# Patient Record
Sex: FEMALE | Race: BLACK OR AFRICAN AMERICAN | NOT HISPANIC OR LATINO
[De-identification: names, ages, dates, MRNs, and addresses within clinical notes are randomized per-mention and may not be internally consistent; named-entity substitution may affect disease eponyms.]

---

## 2018-03-13 ENCOUNTER — APPOINTMENT (OUTPATIENT)
Dept: RHEUMATOLOGY | Facility: CLINIC | Age: 38
End: 2018-03-13

## 2018-03-20 ENCOUNTER — APPOINTMENT (OUTPATIENT)
Dept: RHEUMATOLOGY | Facility: CLINIC | Age: 38
End: 2018-03-20
Payer: MEDICAID

## 2018-03-20 VITALS
OXYGEN SATURATION: 96 % | WEIGHT: 105 LBS | HEART RATE: 72 BPM | DIASTOLIC BLOOD PRESSURE: 73 MMHG | TEMPERATURE: 97.5 F | RESPIRATION RATE: 16 BRPM | HEIGHT: 64 IN | SYSTOLIC BLOOD PRESSURE: 122 MMHG | BODY MASS INDEX: 17.93 KG/M2

## 2018-03-20 DIAGNOSIS — Z82.69 FAMILY HISTORY OF OTHER DISEASES OF THE MUSCULOSKELETAL SYSTEM AND CONNECTIVE TISSUE: ICD-10-CM

## 2018-03-20 DIAGNOSIS — F17.200 NICOTINE DEPENDENCE, UNSPECIFIED, UNCOMPLICATED: ICD-10-CM

## 2018-03-20 DIAGNOSIS — Z78.9 OTHER SPECIFIED HEALTH STATUS: ICD-10-CM

## 2018-03-20 DIAGNOSIS — I10 ESSENTIAL (PRIMARY) HYPERTENSION: ICD-10-CM

## 2018-03-20 DIAGNOSIS — Z86.79 PERSONAL HISTORY OF OTHER DISEASES OF THE CIRCULATORY SYSTEM: ICD-10-CM

## 2018-03-20 PROCEDURE — 99205 OFFICE O/P NEW HI 60 MIN: CPT

## 2018-03-20 RX ORDER — TRIAMCINOLONE ACETONIDE 1 MG/G
0.1 OINTMENT TOPICAL TWICE DAILY
Qty: 80 | Refills: 0 | Status: ACTIVE | COMMUNITY
Start: 2018-03-20 | End: 1900-01-01

## 2018-03-22 LAB
APPEARANCE: CLEAR
BACTERIA: NEGATIVE
BILIRUBIN URINE: NEGATIVE
BLOOD URINE: NEGATIVE
C3 SERPL-MCNC: 101 MG/DL
C4 SERPL-MCNC: 30 MG/DL
COLOR: YELLOW
CREAT SPEC-SCNC: 137 MG/DL
CREAT/PROT UR: 0.1 RATIO
CRP SERPL-MCNC: <0.2 MG/DL
DSDNA AB SER-ACNC: <12 IU/ML
ERYTHROCYTE [SEDIMENTATION RATE] IN BLOOD BY WESTERGREN METHOD: 10 MM/HR
GLUCOSE QUALITATIVE U: NEGATIVE MG/DL
HYALINE CASTS: 3 /LPF
KETONES URINE: NEGATIVE
LEUKOCYTE ESTERASE URINE: NEGATIVE
MICROSCOPIC-UA: NORMAL
NITRITE URINE: NEGATIVE
PH URINE: 5.5
PROT UR-MCNC: 12 MG/DL
PROTEIN URINE: NEGATIVE MG/DL
RED BLOOD CELLS URINE: 7 /HPF
SPECIFIC GRAVITY URINE: 1.03
SQUAMOUS EPITHELIAL CELLS: 2 /HPF
THYROGLOB AB SERPL-ACNC: <20 IU/ML
THYROPEROXIDASE AB SERPL IA-ACNC: <10 IU/ML
UROBILINOGEN URINE: NEGATIVE MG/DL
WHITE BLOOD CELLS URINE: 1 /HPF

## 2018-03-23 LAB
ENA RNP AB SER IA-ACNC: 2.9 AL
ENA SM AB SER IA-ACNC: 0.2 AL
ENA SS-A AB SER IA-ACNC: <0.2 AL
ENA SS-B AB SER IA-ACNC: <0.2 AL

## 2018-03-26 LAB
ANA PAT FLD IF-IMP: ABNORMAL
ANA SER IF-ACNC: ABNORMAL

## 2018-04-26 ENCOUNTER — APPOINTMENT (OUTPATIENT)
Dept: RHEUMATOLOGY | Facility: CLINIC | Age: 38
End: 2018-04-26

## 2018-05-02 ENCOUNTER — APPOINTMENT (OUTPATIENT)
Dept: RHEUMATOLOGY | Facility: CLINIC | Age: 38
End: 2018-05-02

## 2018-07-05 ENCOUNTER — APPOINTMENT (OUTPATIENT)
Dept: RHEUMATOLOGY | Facility: CLINIC | Age: 38
End: 2018-07-05

## 2018-08-23 ENCOUNTER — APPOINTMENT (OUTPATIENT)
Dept: RHEUMATOLOGY | Facility: CLINIC | Age: 38
End: 2018-08-23

## 2018-10-29 ENCOUNTER — APPOINTMENT (OUTPATIENT)
Dept: RHEUMATOLOGY | Facility: CLINIC | Age: 38
End: 2018-10-29

## 2019-01-03 ENCOUNTER — APPOINTMENT (OUTPATIENT)
Dept: ANTEPARTUM | Facility: CLINIC | Age: 39
End: 2019-01-03
Payer: MEDICAID

## 2019-01-03 ENCOUNTER — ASOB RESULT (OUTPATIENT)
Age: 39
End: 2019-01-03

## 2019-01-03 DIAGNOSIS — O28.3 ABNORMAL ULTRASONIC FINDING ON ANTENATAL SCREENING OF MOTHER: ICD-10-CM

## 2019-01-03 PROCEDURE — 59020 FETAL CONTRACT STRESS TEST: CPT

## 2019-01-03 PROCEDURE — 76811 OB US DETAILED SNGL FETUS: CPT

## 2019-01-03 PROCEDURE — 76817 TRANSVAGINAL US OBSTETRIC: CPT

## 2019-01-03 PROCEDURE — 99201 OFFICE OUTPATIENT NEW 10 MINUTES: CPT | Mod: 25,TH

## 2019-01-04 ENCOUNTER — OUTPATIENT (OUTPATIENT)
Dept: OUTPATIENT SERVICES | Age: 39
LOS: 1 days | Discharge: HOME | End: 2019-01-04

## 2019-01-09 ENCOUNTER — APPOINTMENT (OUTPATIENT)
Dept: PEDIATRIC CARDIOLOGY | Facility: CLINIC | Age: 39
End: 2019-01-09
Payer: MEDICAID

## 2019-01-09 PROCEDURE — 76827 ECHO EXAM OF FETAL HEART: CPT

## 2019-01-09 PROCEDURE — 76820 UMBILICAL ARTERY ECHO: CPT

## 2019-01-09 PROCEDURE — 99203 OFFICE O/P NEW LOW 30 MIN: CPT | Mod: 25

## 2019-01-09 PROCEDURE — 76825 ECHO EXAM OF FETAL HEART: CPT

## 2019-01-09 PROCEDURE — 93325 DOPPLER ECHO COLOR FLOW MAPG: CPT | Mod: 59

## 2019-02-11 ENCOUNTER — ASOB RESULT (OUTPATIENT)
Age: 39
End: 2019-02-11

## 2019-02-11 ENCOUNTER — OUTPATIENT (OUTPATIENT)
Dept: OUTPATIENT SERVICES | Facility: HOSPITAL | Age: 39
LOS: 1 days | End: 2019-02-11

## 2019-02-11 ENCOUNTER — APPOINTMENT (OUTPATIENT)
Dept: ANTEPARTUM | Facility: CLINIC | Age: 39
End: 2019-02-11
Payer: MEDICAID

## 2019-02-11 PROCEDURE — 76816 OB US FOLLOW-UP PER FETUS: CPT

## 2019-02-11 PROCEDURE — 76818 FETAL BIOPHYS PROFILE W/NST: CPT | Mod: 26

## 2019-02-20 ENCOUNTER — APPOINTMENT (OUTPATIENT)
Dept: PEDIATRIC CARDIOLOGY | Facility: CLINIC | Age: 39
End: 2019-02-20

## 2019-02-22 DIAGNOSIS — O28.3 ABNORMAL ULTRASONIC FINDING ON ANTENATAL SCREENING OF MOTHER: ICD-10-CM

## 2019-02-22 DIAGNOSIS — O99.333 SMOKING (TOBACCO) COMPLICATING PREGNANCY, THIRD TRIMESTER: ICD-10-CM

## 2019-02-22 DIAGNOSIS — O99.89 OTHER SPECIFIED DISEASES AND CONDITIONS COMPLICATING PREGNANCY, CHILDBIRTH AND THE PUERPERIUM: ICD-10-CM

## 2019-02-22 DIAGNOSIS — O09.523 SUPERVISION OF ELDERLY MULTIGRAVIDA, THIRD TRIMESTER: ICD-10-CM

## 2019-03-13 ENCOUNTER — APPOINTMENT (OUTPATIENT)
Dept: ANTEPARTUM | Facility: CLINIC | Age: 39
End: 2019-03-13
Payer: MEDICAID

## 2019-03-13 ENCOUNTER — ASOB RESULT (OUTPATIENT)
Age: 39
End: 2019-03-13

## 2019-03-13 PROCEDURE — 76816 OB US FOLLOW-UP PER FETUS: CPT

## 2019-03-13 PROCEDURE — 76819 FETAL BIOPHYS PROFIL W/O NST: CPT

## 2019-03-26 ENCOUNTER — RX RENEWAL (OUTPATIENT)
Age: 39
End: 2019-03-26

## 2019-04-09 ENCOUNTER — ASOB RESULT (OUTPATIENT)
Age: 39
End: 2019-04-09

## 2019-04-09 ENCOUNTER — APPOINTMENT (OUTPATIENT)
Dept: ANTEPARTUM | Facility: HOSPITAL | Age: 39
End: 2019-04-09

## 2019-04-09 ENCOUNTER — INPATIENT (INPATIENT)
Facility: HOSPITAL | Age: 39
LOS: 2 days | Discharge: HOME | End: 2019-04-12
Attending: OBSTETRICS & GYNECOLOGY | Admitting: OBSTETRICS & GYNECOLOGY

## 2019-04-09 VITALS
SYSTOLIC BLOOD PRESSURE: 151 MMHG | TEMPERATURE: 98 F | DIASTOLIC BLOOD PRESSURE: 83 MMHG | RESPIRATION RATE: 18 BRPM | HEART RATE: 94 BPM

## 2019-04-09 DIAGNOSIS — Z3A.00 WEEKS OF GESTATION OF PREGNANCY NOT SPECIFIED: ICD-10-CM

## 2019-04-09 DIAGNOSIS — Z98.890 OTHER SPECIFIED POSTPROCEDURAL STATES: Chronic | ICD-10-CM

## 2019-04-09 DIAGNOSIS — O26.899 OTHER SPECIFIED PREGNANCY RELATED CONDITIONS, UNSPECIFIED TRIMESTER: ICD-10-CM

## 2019-04-09 LAB
ALBUMIN SERPL ELPH-MCNC: 3.6 G/DL — SIGNIFICANT CHANGE UP (ref 3.3–5)
ALP SERPL-CCNC: 152 U/L — HIGH (ref 40–120)
ALT FLD-CCNC: 14 U/L — SIGNIFICANT CHANGE UP (ref 4–33)
ANION GAP SERPL CALC-SCNC: 11 MMO/L — SIGNIFICANT CHANGE UP (ref 7–14)
APPEARANCE UR: CLEAR — SIGNIFICANT CHANGE UP
APTT BLD: 39 SEC — HIGH (ref 27.5–36.3)
AST SERPL-CCNC: 20 U/L — SIGNIFICANT CHANGE UP (ref 4–32)
BASOPHILS # BLD AUTO: 0.03 K/UL — SIGNIFICANT CHANGE UP (ref 0–0.2)
BASOPHILS NFR BLD AUTO: 0.3 % — SIGNIFICANT CHANGE UP (ref 0–2)
BILIRUB SERPL-MCNC: 0.2 MG/DL — SIGNIFICANT CHANGE UP (ref 0.2–1.2)
BILIRUB UR-MCNC: NEGATIVE — SIGNIFICANT CHANGE UP
BLD GP AB SCN SERPL QL: NEGATIVE — SIGNIFICANT CHANGE UP
BLOOD UR QL VISUAL: NEGATIVE — SIGNIFICANT CHANGE UP
BUN SERPL-MCNC: 10 MG/DL — SIGNIFICANT CHANGE UP (ref 7–23)
CALCIUM SERPL-MCNC: 9 MG/DL — SIGNIFICANT CHANGE UP (ref 8.4–10.5)
CHLORIDE SERPL-SCNC: 104 MMOL/L — SIGNIFICANT CHANGE UP (ref 98–107)
CO2 SERPL-SCNC: 21 MMOL/L — LOW (ref 22–31)
COLOR SPEC: YELLOW — SIGNIFICANT CHANGE UP
CREAT ?TM UR-MCNC: 86.1 MG/DL — SIGNIFICANT CHANGE UP
CREAT SERPL-MCNC: 0.68 MG/DL — SIGNIFICANT CHANGE UP (ref 0.5–1.3)
EOSINOPHIL # BLD AUTO: 0.13 K/UL — SIGNIFICANT CHANGE UP (ref 0–0.5)
EOSINOPHIL NFR BLD AUTO: 1.3 % — SIGNIFICANT CHANGE UP (ref 0–6)
FIBRINOGEN PPP-MCNC: 493 MG/DL — SIGNIFICANT CHANGE UP (ref 350–510)
GLUCOSE SERPL-MCNC: 69 MG/DL — LOW (ref 70–99)
GLUCOSE UR-MCNC: NEGATIVE — SIGNIFICANT CHANGE UP
HCT VFR BLD CALC: 31.7 % — LOW (ref 34.5–45)
HGB BLD-MCNC: 10.1 G/DL — LOW (ref 11.5–15.5)
IMM GRANULOCYTES NFR BLD AUTO: 0.6 % — SIGNIFICANT CHANGE UP (ref 0–1.5)
INR BLD: 0.91 — SIGNIFICANT CHANGE UP (ref 0.88–1.17)
KETONES UR-MCNC: NEGATIVE — SIGNIFICANT CHANGE UP
LDH SERPL L TO P-CCNC: 203 U/L — SIGNIFICANT CHANGE UP (ref 135–225)
LEUKOCYTE ESTERASE UR-ACNC: NEGATIVE — SIGNIFICANT CHANGE UP
LYMPHOCYTES # BLD AUTO: 1.81 K/UL — SIGNIFICANT CHANGE UP (ref 1–3.3)
LYMPHOCYTES # BLD AUTO: 17.7 % — SIGNIFICANT CHANGE UP (ref 13–44)
MCHC RBC-ENTMCNC: 31.5 PG — SIGNIFICANT CHANGE UP (ref 27–34)
MCHC RBC-ENTMCNC: 31.9 % — LOW (ref 32–36)
MCV RBC AUTO: 98.8 FL — SIGNIFICANT CHANGE UP (ref 80–100)
MONOCYTES # BLD AUTO: 0.98 K/UL — HIGH (ref 0–0.9)
MONOCYTES NFR BLD AUTO: 9.6 % — SIGNIFICANT CHANGE UP (ref 2–14)
NEUTROPHILS # BLD AUTO: 7.21 K/UL — SIGNIFICANT CHANGE UP (ref 1.8–7.4)
NEUTROPHILS NFR BLD AUTO: 70.5 % — SIGNIFICANT CHANGE UP (ref 43–77)
NITRITE UR-MCNC: NEGATIVE — SIGNIFICANT CHANGE UP
NRBC # FLD: 0 K/UL — SIGNIFICANT CHANGE UP (ref 0–0)
PH UR: 7 — SIGNIFICANT CHANGE UP (ref 5–8)
PLATELET # BLD AUTO: 259 K/UL — SIGNIFICANT CHANGE UP (ref 150–400)
PMV BLD: 9.4 FL — SIGNIFICANT CHANGE UP (ref 7–13)
POTASSIUM SERPL-MCNC: 4.3 MMOL/L — SIGNIFICANT CHANGE UP (ref 3.5–5.3)
POTASSIUM SERPL-SCNC: 4.3 MMOL/L — SIGNIFICANT CHANGE UP (ref 3.5–5.3)
PROT SERPL-MCNC: 7.1 G/DL — SIGNIFICANT CHANGE UP (ref 6–8.3)
PROT UR-MCNC: 10 — SIGNIFICANT CHANGE UP
PROT UR-MCNC: 16 MG/DL — SIGNIFICANT CHANGE UP
PROTHROM AB SERPL-ACNC: 10.3 SEC — SIGNIFICANT CHANGE UP (ref 9.8–13.1)
RBC # BLD: 3.21 M/UL — LOW (ref 3.8–5.2)
RBC # FLD: 13.2 % — SIGNIFICANT CHANGE UP (ref 10.3–14.5)
RH IG SCN BLD-IMP: POSITIVE — SIGNIFICANT CHANGE UP
SODIUM SERPL-SCNC: 136 MMOL/L — SIGNIFICANT CHANGE UP (ref 135–145)
SP GR SPEC: 1.02 — SIGNIFICANT CHANGE UP (ref 1–1.04)
URATE SERPL-MCNC: 7.1 MG/DL — HIGH (ref 2.5–7)
UROBILINOGEN FLD QL: NORMAL — SIGNIFICANT CHANGE UP
WBC # BLD: 10.22 K/UL — SIGNIFICANT CHANGE UP (ref 3.8–10.5)
WBC # FLD AUTO: 10.22 K/UL — SIGNIFICANT CHANGE UP (ref 3.8–10.5)

## 2019-04-09 RX ORDER — SODIUM CHLORIDE 9 MG/ML
1000 INJECTION, SOLUTION INTRAVENOUS ONCE
Qty: 0 | Refills: 0 | Status: COMPLETED | OUTPATIENT
Start: 2019-04-09 | End: 2019-04-10

## 2019-04-09 RX ORDER — MAGNESIUM SULFATE 500 MG/ML
2 VIAL (ML) INJECTION
Qty: 40 | Refills: 0 | Status: DISCONTINUED | OUTPATIENT
Start: 2019-04-09 | End: 2019-04-10

## 2019-04-09 RX ORDER — SODIUM CHLORIDE 9 MG/ML
3 INJECTION INTRAMUSCULAR; INTRAVENOUS; SUBCUTANEOUS EVERY 8 HOURS
Qty: 0 | Refills: 0 | Status: DISCONTINUED | OUTPATIENT
Start: 2019-04-09 | End: 2019-04-12

## 2019-04-09 RX ORDER — SODIUM CHLORIDE 9 MG/ML
1000 INJECTION, SOLUTION INTRAVENOUS
Qty: 0 | Refills: 0 | Status: DISCONTINUED | OUTPATIENT
Start: 2019-04-09 | End: 2019-04-10

## 2019-04-09 RX ORDER — MAGNESIUM SULFATE 500 MG/ML
4 VIAL (ML) INJECTION ONCE
Qty: 0 | Refills: 0 | Status: COMPLETED | OUTPATIENT
Start: 2019-04-09 | End: 2019-04-09

## 2019-04-09 RX ORDER — OXYTOCIN 10 UNIT/ML
333.33 VIAL (ML) INJECTION
Qty: 20 | Refills: 0 | Status: COMPLETED | OUTPATIENT
Start: 2019-04-09

## 2019-04-09 RX ADMIN — Medication 50 GM/HR: at 18:47

## 2019-04-09 RX ADMIN — Medication 300 GRAM(S): at 18:25

## 2019-04-09 RX ADMIN — Medication 50 GM/HR: at 19:11

## 2019-04-09 NOTE — OB PROVIDER H&P - NSHPLABSRESULTS_GEN_ALL_CORE
UA-10 protein; PCR-0.18    10.22>10.1/31.7<259    PT-10.3; INR-0.91; PTT-39.0; fibrinogen-493.0  AST-20; ALT-14; uric acid-7.1    VE-0/70/-2    GBS neg

## 2019-04-09 NOTE — OB PROVIDER H&P - ASSESSMENT
39yo  @ 39.2 wks SLIUP with discoid lupus and elevated BP's. Pt was dw Dr Walter. Pt was admitted for pre-eclampsia and was ordered for PO cytotec and magnesium sulfate.

## 2019-04-09 NOTE — OB PROVIDER TRIAGE NOTE - NSHPLABSRESULTS_GEN_ALL_CORE
UA-10 protein; PCR-0.18    10.22>10.1/31.7<259  PT-10.3; INR-0.91; PTT-39.0; fibrinogen-493.0  AST-20; ALT-14; uric acid-7.1

## 2019-04-09 NOTE — OB PROVIDER TRIAGE NOTE - NS_OBGYNHISTORY_OBGYN_ALL_OB_FT
1999-36 wk PPROM/delivery  - FT uncomp  - FT uncomp  - FT uncomp  - FT complicated by pre-eclampsia requiring magnesium sulfate

## 2019-04-09 NOTE — OB PROVIDER TRIAGE NOTE - NSOBPROVIDERNOTE_OBGYN_ALL_OB_FT
37yo  @ 39.2 wks SLIUP with hx disoid lupus, here from office for NST and MFM consult. Pt with multiple elevated BP's; HELLP labs sent. 37yo  @ 39.2 wks SLIUP with hx disoid lupus, here from office for NST and MFM consult. Pt with multiple elevated BP's; HELLP labs sent. Pt was Dr Walter. Pt was admitted for pre-eclampsia. Pt for PO cytotec IOL and magnesium sulfate. Pt hesitant to stay wants to leave AMA. Is back and forth about staying. Pt at this time conscents to staying.

## 2019-04-09 NOTE — OB PROVIDER TRIAGE NOTE - HISTORY OF PRESENT ILLNESS
37yo  @ 39.2 wks SLIUP here from office for NST and ATU/MFM consult due to AMA with maternal hx of discoid lupus. Pt is intact with GFM. Pt denies HA, RUQ/epigastric pain, visual changes. Pt denies any BP issues with this pregnancy but does have a hx of pre-eclampsia with her last preg requiring magnesium sulfate.

## 2019-04-09 NOTE — OB PROVIDER H&P - NS_MATERNALCONCERNS_OBGYN_ALL_OB_FT
maternal with hx discoid lupus Maternal Hx Discoid Lupus  Short Cervix, never started Vaginal progesterone even though prescribed and encouraged  Hx of PTL, PTD and PPROM  Non-compliant  Anemia  AMA  Grand Multiparity

## 2019-04-10 LAB
ALBUMIN SERPL ELPH-MCNC: 3.4 G/DL — SIGNIFICANT CHANGE UP (ref 3.3–5)
ALP SERPL-CCNC: 166 U/L — HIGH (ref 40–120)
ALT FLD-CCNC: 13 U/L — SIGNIFICANT CHANGE UP (ref 4–33)
ANION GAP SERPL CALC-SCNC: 15 MMO/L — HIGH (ref 7–14)
APTT BLD: 27.7 SEC — SIGNIFICANT CHANGE UP (ref 27.5–36.3)
APTT BLD: 30.1 SEC — SIGNIFICANT CHANGE UP (ref 27.5–36.3)
AST SERPL-CCNC: 21 U/L — SIGNIFICANT CHANGE UP (ref 4–32)
BASOPHILS # BLD AUTO: 0.02 K/UL — SIGNIFICANT CHANGE UP (ref 0–0.2)
BASOPHILS # BLD AUTO: 0.04 K/UL — SIGNIFICANT CHANGE UP (ref 0–0.2)
BASOPHILS NFR BLD AUTO: 0.2 % — SIGNIFICANT CHANGE UP (ref 0–2)
BASOPHILS NFR BLD AUTO: 0.2 % — SIGNIFICANT CHANGE UP (ref 0–2)
BILIRUB SERPL-MCNC: 0.2 MG/DL — SIGNIFICANT CHANGE UP (ref 0.2–1.2)
BUN SERPL-MCNC: 7 MG/DL — SIGNIFICANT CHANGE UP (ref 7–23)
CALCIUM SERPL-MCNC: 7.8 MG/DL — LOW (ref 8.4–10.5)
CHLORIDE SERPL-SCNC: 104 MMOL/L — SIGNIFICANT CHANGE UP (ref 98–107)
CO2 SERPL-SCNC: 17 MMOL/L — LOW (ref 22–31)
CREAT SERPL-MCNC: 0.64 MG/DL — SIGNIFICANT CHANGE UP (ref 0.5–1.3)
EOSINOPHIL # BLD AUTO: 0.05 K/UL — SIGNIFICANT CHANGE UP (ref 0–0.5)
EOSINOPHIL # BLD AUTO: 0.14 K/UL — SIGNIFICANT CHANGE UP (ref 0–0.5)
EOSINOPHIL NFR BLD AUTO: 0.3 % — SIGNIFICANT CHANGE UP (ref 0–6)
EOSINOPHIL NFR BLD AUTO: 1.3 % — SIGNIFICANT CHANGE UP (ref 0–6)
FIBRINOGEN PPP-MCNC: 546.2 MG/DL — HIGH (ref 350–510)
FIBRINOGEN PPP-MCNC: 605 MG/DL — HIGH (ref 350–510)
GLUCOSE SERPL-MCNC: 91 MG/DL — SIGNIFICANT CHANGE UP (ref 70–99)
HCT VFR BLD CALC: 28.9 % — LOW (ref 34.5–45)
HCT VFR BLD CALC: 33.3 % — LOW (ref 34.5–45)
HGB BLD-MCNC: 10.6 G/DL — LOW (ref 11.5–15.5)
HGB BLD-MCNC: 9.5 G/DL — LOW (ref 11.5–15.5)
IMM GRANULOCYTES NFR BLD AUTO: 0.6 % — SIGNIFICANT CHANGE UP (ref 0–1.5)
IMM GRANULOCYTES NFR BLD AUTO: 0.9 % — SIGNIFICANT CHANGE UP (ref 0–1.5)
INR BLD: 0.86 — LOW (ref 0.88–1.17)
LDH SERPL L TO P-CCNC: 209 U/L — SIGNIFICANT CHANGE UP (ref 135–225)
LYMPHOCYTES # BLD AUTO: 1.55 K/UL — SIGNIFICANT CHANGE UP (ref 1–3.3)
LYMPHOCYTES # BLD AUTO: 1.89 K/UL — SIGNIFICANT CHANGE UP (ref 1–3.3)
LYMPHOCYTES # BLD AUTO: 17.9 % — SIGNIFICANT CHANGE UP (ref 13–44)
LYMPHOCYTES # BLD AUTO: 8.8 % — LOW (ref 13–44)
MAGNESIUM SERPL-MCNC: 4.4 MG/DL — HIGH (ref 1.6–2.6)
MAGNESIUM SERPL-MCNC: 4.8 MG/DL — HIGH (ref 1.6–2.6)
MAGNESIUM SERPL-MCNC: 5.6 MG/DL — HIGH (ref 1.6–2.6)
MAGNESIUM SERPL-MCNC: 6.2 MG/DL — HIGH (ref 1.6–2.6)
MAGNESIUM SERPL-MCNC: 6.7 MG/DL — HIGH (ref 1.6–2.6)
MCHC RBC-ENTMCNC: 30.9 PG — SIGNIFICANT CHANGE UP (ref 27–34)
MCHC RBC-ENTMCNC: 31.8 % — LOW (ref 32–36)
MCHC RBC-ENTMCNC: 31.8 PG — SIGNIFICANT CHANGE UP (ref 27–34)
MCHC RBC-ENTMCNC: 32.9 % — SIGNIFICANT CHANGE UP (ref 32–36)
MCV RBC AUTO: 96.7 FL — SIGNIFICANT CHANGE UP (ref 80–100)
MCV RBC AUTO: 97.1 FL — SIGNIFICANT CHANGE UP (ref 80–100)
MONOCYTES # BLD AUTO: 0.73 K/UL — SIGNIFICANT CHANGE UP (ref 0–0.9)
MONOCYTES # BLD AUTO: 1.13 K/UL — HIGH (ref 0–0.9)
MONOCYTES NFR BLD AUTO: 6.4 % — SIGNIFICANT CHANGE UP (ref 2–14)
MONOCYTES NFR BLD AUTO: 6.9 % — SIGNIFICANT CHANGE UP (ref 2–14)
NEUTROPHILS # BLD AUTO: 14.75 K/UL — HIGH (ref 1.8–7.4)
NEUTROPHILS # BLD AUTO: 7.66 K/UL — HIGH (ref 1.8–7.4)
NEUTROPHILS NFR BLD AUTO: 72.8 % — SIGNIFICANT CHANGE UP (ref 43–77)
NEUTROPHILS NFR BLD AUTO: 83.7 % — HIGH (ref 43–77)
NRBC # FLD: 0 K/UL — SIGNIFICANT CHANGE UP (ref 0–0)
NRBC # FLD: 0 K/UL — SIGNIFICANT CHANGE UP (ref 0–0)
PLATELET # BLD AUTO: 252 K/UL — SIGNIFICANT CHANGE UP (ref 150–400)
PLATELET # BLD AUTO: 277 K/UL — SIGNIFICANT CHANGE UP (ref 150–400)
PMV BLD: 9.3 FL — SIGNIFICANT CHANGE UP (ref 7–13)
PMV BLD: 9.4 FL — SIGNIFICANT CHANGE UP (ref 7–13)
POTASSIUM SERPL-MCNC: 4.2 MMOL/L — SIGNIFICANT CHANGE UP (ref 3.5–5.3)
POTASSIUM SERPL-SCNC: 4.2 MMOL/L — SIGNIFICANT CHANGE UP (ref 3.5–5.3)
PROT SERPL-MCNC: 7.3 G/DL — SIGNIFICANT CHANGE UP (ref 6–8.3)
PROTHROM AB SERPL-ACNC: 9.7 SEC — LOW (ref 9.8–13.1)
RBC # BLD: 2.99 M/UL — LOW (ref 3.8–5.2)
RBC # BLD: 3.43 M/UL — LOW (ref 3.8–5.2)
RBC # FLD: 13.3 % — SIGNIFICANT CHANGE UP (ref 10.3–14.5)
RBC # FLD: 13.3 % — SIGNIFICANT CHANGE UP (ref 10.3–14.5)
SODIUM SERPL-SCNC: 136 MMOL/L — SIGNIFICANT CHANGE UP (ref 135–145)
T PALLIDUM AB TITR SER: NEGATIVE — SIGNIFICANT CHANGE UP
URATE SERPL-MCNC: 7.3 MG/DL — HIGH (ref 2.5–7)
WBC # BLD: 10.53 K/UL — HIGH (ref 3.8–10.5)
WBC # BLD: 17.62 K/UL — HIGH (ref 3.8–10.5)
WBC # FLD AUTO: 10.53 K/UL — HIGH (ref 3.8–10.5)
WBC # FLD AUTO: 17.62 K/UL — HIGH (ref 3.8–10.5)

## 2019-04-10 RX ORDER — MAGNESIUM HYDROXIDE 400 MG/1
30 TABLET, CHEWABLE ORAL
Qty: 0 | Refills: 0 | Status: DISCONTINUED | OUTPATIENT
Start: 2019-04-10 | End: 2019-04-12

## 2019-04-10 RX ORDER — HYDROCORTISONE 1 %
1 OINTMENT (GRAM) TOPICAL EVERY 4 HOURS
Qty: 0 | Refills: 0 | Status: DISCONTINUED | OUTPATIENT
Start: 2019-04-10 | End: 2019-04-11

## 2019-04-10 RX ORDER — SODIUM CHLORIDE 9 MG/ML
3 INJECTION INTRAMUSCULAR; INTRAVENOUS; SUBCUTANEOUS EVERY 8 HOURS
Qty: 0 | Refills: 0 | Status: DISCONTINUED | OUTPATIENT
Start: 2019-04-10 | End: 2019-04-12

## 2019-04-10 RX ORDER — OXYTOCIN 10 UNIT/ML
41.67 VIAL (ML) INJECTION
Qty: 20 | Refills: 0 | Status: DISCONTINUED | OUTPATIENT
Start: 2019-04-10 | End: 2019-04-11

## 2019-04-10 RX ORDER — CARBOPROST TROMETHAMINE 250 UG/ML
250 INJECTION, SOLUTION INTRAMUSCULAR ONCE
Qty: 0 | Refills: 0 | Status: COMPLETED | OUTPATIENT
Start: 2019-04-10 | End: 2019-04-10

## 2019-04-10 RX ORDER — OXYCODONE HYDROCHLORIDE 5 MG/1
5 TABLET ORAL EVERY 4 HOURS
Qty: 0 | Refills: 0 | Status: DISCONTINUED | OUTPATIENT
Start: 2019-04-10 | End: 2019-04-12

## 2019-04-10 RX ORDER — MORPHINE SULFATE 50 MG/1
4 CAPSULE, EXTENDED RELEASE ORAL ONCE
Qty: 0 | Refills: 0 | Status: DISCONTINUED | OUTPATIENT
Start: 2019-04-10 | End: 2019-04-10

## 2019-04-10 RX ORDER — DIBUCAINE 1 %
1 OINTMENT (GRAM) RECTAL EVERY 4 HOURS
Qty: 0 | Refills: 0 | Status: DISCONTINUED | OUTPATIENT
Start: 2019-04-10 | End: 2019-04-12

## 2019-04-10 RX ORDER — PRAMOXINE HYDROCHLORIDE 150 MG/15G
1 AEROSOL, FOAM RECTAL EVERY 4 HOURS
Qty: 0 | Refills: 0 | Status: DISCONTINUED | OUTPATIENT
Start: 2019-04-10 | End: 2019-04-11

## 2019-04-10 RX ORDER — IBUPROFEN 200 MG
600 TABLET ORAL EVERY 6 HOURS
Qty: 0 | Refills: 0 | Status: DISCONTINUED | OUTPATIENT
Start: 2019-04-10 | End: 2019-04-12

## 2019-04-10 RX ORDER — ACETAMINOPHEN 500 MG
975 TABLET ORAL EVERY 6 HOURS
Qty: 0 | Refills: 0 | Status: DISCONTINUED | OUTPATIENT
Start: 2019-04-10 | End: 2019-04-12

## 2019-04-10 RX ORDER — SODIUM CHLORIDE 9 MG/ML
3 INJECTION INTRAMUSCULAR; INTRAVENOUS; SUBCUTANEOUS EVERY 8 HOURS
Qty: 0 | Refills: 0 | Status: DISCONTINUED | OUTPATIENT
Start: 2019-04-10 | End: 2019-04-10

## 2019-04-10 RX ORDER — DIPHENOXYLATE HCL/ATROPINE 2.5-.025MG
2 TABLET ORAL ONCE
Qty: 0 | Refills: 0 | Status: DISCONTINUED | OUTPATIENT
Start: 2019-04-10 | End: 2019-04-10

## 2019-04-10 RX ORDER — KETOROLAC TROMETHAMINE 30 MG/ML
30 SYRINGE (ML) INJECTION ONCE
Qty: 0 | Refills: 0 | Status: DISCONTINUED | OUTPATIENT
Start: 2019-04-10 | End: 2019-04-10

## 2019-04-10 RX ORDER — SIMETHICONE 80 MG/1
80 TABLET, CHEWABLE ORAL EVERY 6 HOURS
Qty: 0 | Refills: 0 | Status: DISCONTINUED | OUTPATIENT
Start: 2019-04-10 | End: 2019-04-12

## 2019-04-10 RX ORDER — HYDROCORTISONE 1 %
1 OINTMENT (GRAM) TOPICAL EVERY 4 HOURS
Qty: 0 | Refills: 0 | Status: DISCONTINUED | OUTPATIENT
Start: 2019-04-10 | End: 2019-04-10

## 2019-04-10 RX ORDER — OXYTOCIN 10 UNIT/ML
41.67 VIAL (ML) INJECTION
Qty: 20 | Refills: 0 | Status: DISCONTINUED | OUTPATIENT
Start: 2019-04-10 | End: 2019-04-10

## 2019-04-10 RX ORDER — OXYCODONE HYDROCHLORIDE 5 MG/1
5 TABLET ORAL
Qty: 0 | Refills: 0 | Status: DISCONTINUED | OUTPATIENT
Start: 2019-04-10 | End: 2019-04-12

## 2019-04-10 RX ORDER — PRAMOXINE HYDROCHLORIDE 150 MG/15G
1 AEROSOL, FOAM RECTAL EVERY 4 HOURS
Qty: 0 | Refills: 0 | Status: DISCONTINUED | OUTPATIENT
Start: 2019-04-10 | End: 2019-04-10

## 2019-04-10 RX ORDER — DIPHENHYDRAMINE HCL 50 MG
25 CAPSULE ORAL EVERY 6 HOURS
Qty: 0 | Refills: 0 | Status: DISCONTINUED | OUTPATIENT
Start: 2019-04-10 | End: 2019-04-12

## 2019-04-10 RX ORDER — OXYTOCIN 10 UNIT/ML
333.33 VIAL (ML) INJECTION
Qty: 20 | Refills: 0 | Status: DISCONTINUED | OUTPATIENT
Start: 2019-04-10 | End: 2019-04-10

## 2019-04-10 RX ORDER — DIBUCAINE 1 %
1 OINTMENT (GRAM) RECTAL EVERY 4 HOURS
Qty: 0 | Refills: 0 | Status: DISCONTINUED | OUTPATIENT
Start: 2019-04-10 | End: 2019-04-10

## 2019-04-10 RX ORDER — GLYCERIN ADULT
1 SUPPOSITORY, RECTAL RECTAL AT BEDTIME
Qty: 0 | Refills: 0 | Status: DISCONTINUED | OUTPATIENT
Start: 2019-04-10 | End: 2019-04-12

## 2019-04-10 RX ORDER — SODIUM CHLORIDE 9 MG/ML
1000 INJECTION, SOLUTION INTRAVENOUS
Qty: 0 | Refills: 0 | Status: DISCONTINUED | OUTPATIENT
Start: 2019-04-10 | End: 2019-04-12

## 2019-04-10 RX ORDER — AER TRAVELER 0.5 G/1
1 SOLUTION RECTAL; TOPICAL EVERY 4 HOURS
Qty: 0 | Refills: 0 | Status: DISCONTINUED | OUTPATIENT
Start: 2019-04-10 | End: 2019-04-10

## 2019-04-10 RX ORDER — GENTAMICIN SULFATE 40 MG/ML
280 VIAL (ML) INJECTION EVERY 24 HOURS
Qty: 0 | Refills: 0 | Status: DISCONTINUED | OUTPATIENT
Start: 2019-04-10 | End: 2019-04-12

## 2019-04-10 RX ORDER — BUTORPHANOL TARTRATE 2 MG/ML
2 INJECTION, SOLUTION INTRAMUSCULAR; INTRAVENOUS ONCE
Qty: 0 | Refills: 0 | Status: DISCONTINUED | OUTPATIENT
Start: 2019-04-10 | End: 2019-04-10

## 2019-04-10 RX ORDER — AER TRAVELER 0.5 G/1
1 SOLUTION RECTAL; TOPICAL EVERY 4 HOURS
Qty: 0 | Refills: 0 | Status: DISCONTINUED | OUTPATIENT
Start: 2019-04-10 | End: 2019-04-12

## 2019-04-10 RX ORDER — TETANUS TOXOID, REDUCED DIPHTHERIA TOXOID AND ACELLULAR PERTUSSIS VACCINE, ADSORBED 5; 2.5; 8; 8; 2.5 [IU]/.5ML; [IU]/.5ML; UG/.5ML; UG/.5ML; UG/.5ML
0.5 SUSPENSION INTRAMUSCULAR ONCE
Qty: 0 | Refills: 0 | Status: DISCONTINUED | OUTPATIENT
Start: 2019-04-10 | End: 2019-04-12

## 2019-04-10 RX ORDER — ACETAMINOPHEN 500 MG
975 TABLET ORAL EVERY 6 HOURS
Qty: 0 | Refills: 0 | Status: COMPLETED | OUTPATIENT
Start: 2019-04-10 | End: 2020-03-08

## 2019-04-10 RX ORDER — MAGNESIUM SULFATE 500 MG/ML
1 VIAL (ML) INJECTION
Qty: 40 | Refills: 0 | Status: DISCONTINUED | OUTPATIENT
Start: 2019-04-10 | End: 2019-04-11

## 2019-04-10 RX ORDER — DOCUSATE SODIUM 100 MG
100 CAPSULE ORAL
Qty: 0 | Refills: 0 | Status: DISCONTINUED | OUTPATIENT
Start: 2019-04-10 | End: 2019-04-11

## 2019-04-10 RX ORDER — LANOLIN
1 OINTMENT (GRAM) TOPICAL EVERY 6 HOURS
Qty: 0 | Refills: 0 | Status: DISCONTINUED | OUTPATIENT
Start: 2019-04-10 | End: 2019-04-12

## 2019-04-10 RX ADMIN — Medication 250 MILLIGRAM(S): at 22:42

## 2019-04-10 RX ADMIN — Medication 125 MILLIUNIT(S)/MIN: at 20:10

## 2019-04-10 RX ADMIN — Medication 25 GM/HR: at 23:36

## 2019-04-10 RX ADMIN — Medication 25 GM/HR: at 11:46

## 2019-04-10 RX ADMIN — Medication 50 GM/HR: at 06:03

## 2019-04-10 RX ADMIN — SODIUM CHLORIDE 75 MILLILITER(S): 9 INJECTION, SOLUTION INTRAVENOUS at 11:48

## 2019-04-10 RX ADMIN — MORPHINE SULFATE 4 MILLIGRAM(S): 50 CAPSULE, EXTENDED RELEASE ORAL at 20:00

## 2019-04-10 RX ADMIN — Medication 25 GM/HR: at 17:56

## 2019-04-10 RX ADMIN — BUTORPHANOL TARTRATE 2 MILLIGRAM(S): 2 INJECTION, SOLUTION INTRAMUSCULAR; INTRAVENOUS at 03:31

## 2019-04-10 RX ADMIN — SODIUM CHLORIDE 3 MILLILITER(S): 9 INJECTION INTRAMUSCULAR; INTRAVENOUS; SUBCUTANEOUS at 16:19

## 2019-04-10 RX ADMIN — CARBOPROST TROMETHAMINE 250 MICROGRAM(S): 250 INJECTION, SOLUTION INTRAMUSCULAR at 20:09

## 2019-04-10 RX ADMIN — Medication 2 TABLET(S): at 20:09

## 2019-04-10 RX ADMIN — Medication 100 MILLIGRAM(S): at 21:23

## 2019-04-10 RX ADMIN — SODIUM CHLORIDE 3 MILLILITER(S): 9 INJECTION INTRAMUSCULAR; INTRAVENOUS; SUBCUTANEOUS at 06:30

## 2019-04-10 RX ADMIN — MORPHINE SULFATE 4 MILLIGRAM(S): 50 CAPSULE, EXTENDED RELEASE ORAL at 20:30

## 2019-04-10 RX ADMIN — Medication 1000 MILLIUNIT(S)/MIN: at 19:38

## 2019-04-10 RX ADMIN — SODIUM CHLORIDE 50 MILLILITER(S): 9 INJECTION, SOLUTION INTRAVENOUS at 07:48

## 2019-04-10 RX ADMIN — SODIUM CHLORIDE 2000 MILLILITER(S): 9 INJECTION, SOLUTION INTRAVENOUS at 07:00

## 2019-04-10 RX ADMIN — BUTORPHANOL TARTRATE 2 MILLIGRAM(S): 2 INJECTION, SOLUTION INTRAMUSCULAR; INTRAVENOUS at 04:00

## 2019-04-10 NOTE — CHART NOTE - NSCHARTNOTEFT_GEN_A_CORE
Bedside VE 0/70/-3    Cat 1  moderate variability with accels/ no decels. ctx irregularly q4-6 mins  epidural in place  vaginal cytotec inserted  patient tolerated well      Neal RAYGOZA

## 2019-04-10 NOTE — CHART NOTE - NSCHARTNOTEFT_GEN_A_CORE
cat 1 fhr tracing, fhr 125 with mod variability, accels, no decels  contractions q5-6min  vaginal cytotec #1 placed @ 1115am per Dr. Saba Lowe NP

## 2019-04-10 NOTE — OB PROVIDER DELIVERY SUMMARY - NSPROVIDERDELIVERYNOTE_OBGYN_ALL_OB_FT
39 yo  at 39+3/7 weeks IUP admitted for IOL due to PEC. Started on magnesium and PO Cytotec followed by Vaginal Cytotec. Pt.'s cervix was closed for almost 24 hours then she had SROM at 1814 pm followed by prolonged decel then Cat II tracing and became fully dilated at 1857 PM.     Delivered a live male infant in "OA" at 1934 PM over intact perineum. Shoulder and body followed easily. Thick meconium, positive cord around body and left ankle. Cord was clamped and cut and infant handed off to NICU team. Apgar 8/9, Weight 2700 Gms or 5 lbs 15 oz.   Spontaneous placenta delivered at 1938 PM, it was intact with 3VC. Patient had brief uterine atony and gush of blood total 500 cc. Uterine message done with manual exploration. Pitocin started. SD Cytotec 1000 mcg given. There was a very small 1st degree laceration which was NOT bleeding. Pt. then kept on having intermittent gush of blood. Manual exploration done again and about 200 cc of blood and clots removed from the lower uterine segment. Again Fundus was Firm. A dose Hemabate given  mcg.  Morphine 4 mg given for patient's discomfort despite the epidural. Total  cc.     Pt. will be on Clinda and Gent IV x 24 hours for Manual Exploration. Magnesium sulfate to continue at 1Gm/Hr for 24 hrs. Will consider early stopping after 12 hours if BP is NORMAL.  Counts are correct x 2. Mother and baby are stable. Check CBC at 2230 pm and in am.

## 2019-04-10 NOTE — CHART NOTE - NSCHARTNOTEFT_GEN_A_CORE
Patient examined due to pain. S/p Stadol 3:30am.     SVE - 0/70/-3    Patient approved for epidural. Will administer once repeat HELLP labs result and plts are wnl.     D/w Dr. Angel Back PGY-1

## 2019-04-10 NOTE — OB NEONATOLOGY/PEDIATRICIAN DELIVERY SUMMARY - NSPEDSNEONOTESA_OBGYN_ALL_OB_FT
Baby ariel Morejon is born at 39.3 weeks via  to a 37 yo  A+ blood type female. Maternal hx of discoid lupus, no significant prenatal hx. IOL for htn, peds in attendance for heavy meconium. PNL neg/NR/imm, GBS neg as of 3/25. SROM produced heavy mec-stained fluid at 18:14, baby emerged at 19:34 with cord around the leg x1, irregular cry but overall vigorous. He was w/d/s/s with bulb suction only, and earned APGARs 8/9 (1 each for color and cry at 1 min). Mother of baby plans to breastfeed and would like Hep B and circ for baby prior to discharge. EOS 0.06.

## 2019-04-10 NOTE — CHART NOTE - NSCHARTNOTEFT_GEN_A_CORE
OB Attendin yo  at 39+3/7 weeks IUP admitted for IOL due to PEC. PNC was complicated by Short Cervix, Hx of PEC, Hx of PTL/PPROM and PTD, Discoid Lupus, Anemia, AMA, Grand Multiparity and Non-compliance.  Her admission BP was in the range of 160-180/80-90s. She was started on PO Cytotec due to VE: Closed/70/-3 and Magnesium prophylaxis. Patient has irregular Ctx, s/p Epidural. Still C/O having discomfort.   BP was overall in the low range (110-140/55 to 80s) with occasional sevre range , 175, 164s etc. Patient had HA earlier today    Vital Signs Last 24 Hrs  T(C): 37.0 (10 Apr 2019 02:01), Max: 37.0 (10 Apr 2019 02:01)  T(F): 98.6 (10 Apr 2019 02:01), Max: 98.6 (10 Apr 2019 02:01)  HR: 103 (10 Apr 2019 10:13) (49 - 118)  BP: 130/80 (10 Apr 2019 09:55) (102/51 - 190/83)  BP(mean): --  RR: 18 (2019 18:05) (18 - 18)  SpO2: 100% (10 Apr 2019 10:13) (88% - 100%)    I&O's Summary    2019 07:01  -  10 Apr 2019 07:00  --------------------------------------------------------  IN: 1000 mL / OUT: 2450 mL / NET: -1450 mL      PHYSICAL EXAM:      Constitutional:  NAD, AAO x 3    Respiratory:  CTA B/L, No W/R/R    Cardiovascular: Positive S1S2, RRR (sometimes Tachycardia)    Gastrointestinal: Soft abdomen, Gravid, NT    Extremities: No C/C/E    Neurological: DTR: +2 to +3/4 B/L LE    [place]: Irregular Q2 to 6 to 7 minutes    EFM: 120s, cat I tracing    SVE:  Closed, posterior, -2, Lafleur Balloon is felt on the left side of the fetal head in the vagina                          10.6   10.53 )-----------( 277      ( 10 Apr 2019 05:45 )             33.3     04-10    136  |  104  |  7   ----------------------------<  91  4.2   |  17<L>  |  0.64    Ca    7.8<L>      10 Apr 2019 05:45  Mg     6.2     04-10    TPro  7.3  /  Alb  3.4  /  TBili  0.2  /  DBili  x   /  AST  21  /  ALT  13  /  AlkPhos  166<H>  -10    PT/INR - ( 10 Apr 2019 05:45 )   PT: 9.7 SEC;   INR: 0.86       Uric acid: 7.1    Mag level: 6.2 in am       PTT - ( 10 Apr 2019 05:45 )  PTT:30.1 SEC    Urinalysis Basic - ( 2019 15:33 )    Color: YELLOW / Appearance: CLEAR / S.020 / pH: 7.0  Gluc: NEGATIVE / Ketone: NEGATIVE  / Bili: NEGATIVE / Urobili: NORMAL   Blood: NEGATIVE / Protein: 10 / Nitrite: NEGATIVE   Leuk Esterase: NEGATIVE / RBC: x / WBC x   Sq Epi: x / Non Sq Epi: x / Bacteria: x      A/P:  IUP at 39+3/7 weeks, PEC, Short Cervix, Hx of PEC, Hx of PTL/PPROM and PTD, Discoid Lupus, Anemia, AMA, Grand Multiparity and Non-compliance.    Change PO Cytotec to Vaginal Cytotec 25 mcg per protocol  Decrease Magnesium level to 1Gm/Hr  No S/S of Mag Toxicity at this time  Readjust Lafleur catheter (Balloon felt on the left upper vagina / urethra)  Try AROM when possible  EFW: about 2800 to 2900 Gms  MFS reassuring  Anticipate

## 2019-04-10 NOTE — CHART NOTE - NSCHARTNOTEFT_GEN_A_CORE
Pt examined for decel. Last exam was closed    Vital Signs Last 24 Hrs  T(C): 36.6 (10 Apr 2019 12:15), Max: 37.0 (10 Apr 2019 02:01)  T(F): 97.88 (10 Apr 2019 12:15), Max: 98.6 (10 Apr 2019 02:01)  HR: 99 (10 Apr 2019 18:35) (49 - 118)  BP: 174/92 (10 Apr 2019 18:27) (102/51 - 190/83)  BP(mean): --  RR: --  SpO2: 93% (10 Apr 2019 18:35) (86% - 100%)    VE: 5/70/-1  EFM: 120, mod, prolonged decel to 80s x4min - recovered  [place]: q2-5min    - anticipate   - Dr. Walter en route  EDNA Oropeza PGY1  d/w Dr. Walter

## 2019-04-11 ENCOUNTER — TRANSCRIPTION ENCOUNTER (OUTPATIENT)
Age: 39
End: 2019-04-11

## 2019-04-11 LAB
BASOPHILS # BLD AUTO: 0.01 K/UL — SIGNIFICANT CHANGE UP (ref 0–0.2)
BASOPHILS # BLD AUTO: 0.02 K/UL — SIGNIFICANT CHANGE UP (ref 0–0.2)
BASOPHILS NFR BLD AUTO: 0.1 % — SIGNIFICANT CHANGE UP (ref 0–2)
BASOPHILS NFR BLD AUTO: 0.2 % — SIGNIFICANT CHANGE UP (ref 0–2)
EOSINOPHIL # BLD AUTO: 0.09 K/UL — SIGNIFICANT CHANGE UP (ref 0–0.5)
EOSINOPHIL # BLD AUTO: 0.1 K/UL — SIGNIFICANT CHANGE UP (ref 0–0.5)
EOSINOPHIL NFR BLD AUTO: 0.8 % — SIGNIFICANT CHANGE UP (ref 0–6)
EOSINOPHIL NFR BLD AUTO: 1 % — SIGNIFICANT CHANGE UP (ref 0–6)
HCT VFR BLD CALC: 22.5 % — LOW (ref 34.5–45)
HCT VFR BLD CALC: 22.9 % — LOW (ref 34.5–45)
HGB BLD-MCNC: 7.4 G/DL — LOW (ref 11.5–15.5)
HGB BLD-MCNC: 7.5 G/DL — LOW (ref 11.5–15.5)
IMM GRANULOCYTES NFR BLD AUTO: 0.4 % — SIGNIFICANT CHANGE UP (ref 0–1.5)
IMM GRANULOCYTES NFR BLD AUTO: 0.6 % — SIGNIFICANT CHANGE UP (ref 0–1.5)
LYMPHOCYTES # BLD AUTO: 1.86 K/UL — SIGNIFICANT CHANGE UP (ref 1–3.3)
LYMPHOCYTES # BLD AUTO: 18.1 % — SIGNIFICANT CHANGE UP (ref 13–44)
LYMPHOCYTES # BLD AUTO: 18.2 % — SIGNIFICANT CHANGE UP (ref 13–44)
LYMPHOCYTES # BLD AUTO: 2.15 K/UL — SIGNIFICANT CHANGE UP (ref 1–3.3)
MAGNESIUM SERPL-MCNC: 3.5 MG/DL — HIGH (ref 1.6–2.6)
MAGNESIUM SERPL-MCNC: 3.6 MG/DL — HIGH (ref 1.6–2.6)
MAGNESIUM SERPL-MCNC: 3.6 MG/DL — HIGH (ref 1.6–2.6)
MCHC RBC-ENTMCNC: 31.2 PG — SIGNIFICANT CHANGE UP (ref 27–34)
MCHC RBC-ENTMCNC: 32.3 % — SIGNIFICANT CHANGE UP (ref 32–36)
MCHC RBC-ENTMCNC: 32.5 PG — SIGNIFICANT CHANGE UP (ref 27–34)
MCHC RBC-ENTMCNC: 33.3 % — SIGNIFICANT CHANGE UP (ref 32–36)
MCV RBC AUTO: 96.6 FL — SIGNIFICANT CHANGE UP (ref 80–100)
MCV RBC AUTO: 97.4 FL — SIGNIFICANT CHANGE UP (ref 80–100)
MONOCYTES # BLD AUTO: 0.93 K/UL — HIGH (ref 0–0.9)
MONOCYTES # BLD AUTO: 1.18 K/UL — HIGH (ref 0–0.9)
MONOCYTES NFR BLD AUTO: 9.1 % — SIGNIFICANT CHANGE UP (ref 2–14)
MONOCYTES NFR BLD AUTO: 9.9 % — SIGNIFICANT CHANGE UP (ref 2–14)
NEUTROPHILS # BLD AUTO: 7.27 K/UL — SIGNIFICANT CHANGE UP (ref 1.8–7.4)
NEUTROPHILS # BLD AUTO: 8.4 K/UL — HIGH (ref 1.8–7.4)
NEUTROPHILS NFR BLD AUTO: 70.4 % — SIGNIFICANT CHANGE UP (ref 43–77)
NEUTROPHILS NFR BLD AUTO: 71.2 % — SIGNIFICANT CHANGE UP (ref 43–77)
NRBC # FLD: 0 K/UL — SIGNIFICANT CHANGE UP (ref 0–0)
NRBC # FLD: 0.03 K/UL — SIGNIFICANT CHANGE UP (ref 0–0)
PLATELET # BLD AUTO: 185 K/UL — SIGNIFICANT CHANGE UP (ref 150–400)
PLATELET # BLD AUTO: 209 K/UL — SIGNIFICANT CHANGE UP (ref 150–400)
PMV BLD: 9.3 FL — SIGNIFICANT CHANGE UP (ref 7–13)
PMV BLD: 9.4 FL — SIGNIFICANT CHANGE UP (ref 7–13)
RBC # BLD: 2.31 M/UL — LOW (ref 3.8–5.2)
RBC # BLD: 2.37 M/UL — LOW (ref 3.8–5.2)
RBC # FLD: 13.2 % — SIGNIFICANT CHANGE UP (ref 10.3–14.5)
RBC # FLD: 13.4 % — SIGNIFICANT CHANGE UP (ref 10.3–14.5)
WBC # BLD: 10.21 K/UL — SIGNIFICANT CHANGE UP (ref 3.8–10.5)
WBC # BLD: 11.91 K/UL — HIGH (ref 3.8–10.5)
WBC # FLD AUTO: 10.21 K/UL — SIGNIFICANT CHANGE UP (ref 3.8–10.5)
WBC # FLD AUTO: 11.91 K/UL — HIGH (ref 3.8–10.5)

## 2019-04-11 RX ORDER — FERROUS SULFATE 325(65) MG
1 TABLET ORAL
Qty: 90 | Refills: 0 | OUTPATIENT
Start: 2019-04-11 | End: 2019-05-10

## 2019-04-11 RX ORDER — ASPIRIN/CALCIUM CARB/MAGNESIUM 324 MG
1 TABLET ORAL
Qty: 0 | Refills: 0 | COMMUNITY

## 2019-04-11 RX ORDER — ASCORBIC ACID 60 MG
500 TABLET,CHEWABLE ORAL
Qty: 0 | Refills: 0 | Status: DISCONTINUED | OUTPATIENT
Start: 2019-04-11 | End: 2019-04-12

## 2019-04-11 RX ORDER — PRAMOXINE HYDROCHLORIDE 150 MG/15G
1 AEROSOL, FOAM RECTAL EVERY 4 HOURS
Qty: 0 | Refills: 0 | Status: DISCONTINUED | OUTPATIENT
Start: 2019-04-11 | End: 2019-04-12

## 2019-04-11 RX ORDER — HYDROCORTISONE 1 %
1 OINTMENT (GRAM) TOPICAL EVERY 4 HOURS
Qty: 0 | Refills: 0 | Status: DISCONTINUED | OUTPATIENT
Start: 2019-04-11 | End: 2019-04-12

## 2019-04-11 RX ORDER — DOCUSATE SODIUM 100 MG
100 CAPSULE ORAL
Qty: 0 | Refills: 0 | Status: DISCONTINUED | OUTPATIENT
Start: 2019-04-11 | End: 2019-04-12

## 2019-04-11 RX ORDER — ACETAMINOPHEN 500 MG
3 TABLET ORAL
Qty: 0 | Refills: 0 | COMMUNITY
Start: 2019-04-11

## 2019-04-11 RX ORDER — BENZOYL PEROXIDE MICRONIZED 5.8 %
0 TOWELETTE (EA) TOPICAL
Qty: 0 | Refills: 0 | COMMUNITY

## 2019-04-11 RX ORDER — FERROUS SULFATE 325(65) MG
325 TABLET ORAL THREE TIMES A DAY
Qty: 0 | Refills: 0 | Status: DISCONTINUED | OUTPATIENT
Start: 2019-04-11 | End: 2019-04-12

## 2019-04-11 RX ORDER — DOCUSATE SODIUM 100 MG
1 CAPSULE ORAL
Qty: 60 | Refills: 0 | OUTPATIENT
Start: 2019-04-11

## 2019-04-11 RX ADMIN — Medication 975 MILLIGRAM(S): at 14:53

## 2019-04-11 RX ADMIN — Medication 325 MILLIGRAM(S): at 09:27

## 2019-04-11 RX ADMIN — Medication 25 GM/HR: at 19:42

## 2019-04-11 RX ADMIN — Medication 975 MILLIGRAM(S): at 00:04

## 2019-04-11 RX ADMIN — OXYCODONE HYDROCHLORIDE 5 MILLIGRAM(S): 5 TABLET ORAL at 00:34

## 2019-04-11 RX ADMIN — SODIUM CHLORIDE 3 MILLILITER(S): 9 INJECTION INTRAMUSCULAR; INTRAVENOUS; SUBCUTANEOUS at 06:14

## 2019-04-11 RX ADMIN — OXYCODONE HYDROCHLORIDE 5 MILLIGRAM(S): 5 TABLET ORAL at 10:27

## 2019-04-11 RX ADMIN — Medication 325 MILLIGRAM(S): at 18:41

## 2019-04-11 RX ADMIN — Medication 975 MILLIGRAM(S): at 06:52

## 2019-04-11 RX ADMIN — OXYCODONE HYDROCHLORIDE 5 MILLIGRAM(S): 5 TABLET ORAL at 19:13

## 2019-04-11 RX ADMIN — OXYCODONE HYDROCHLORIDE 5 MILLIGRAM(S): 5 TABLET ORAL at 00:02

## 2019-04-11 RX ADMIN — OXYCODONE HYDROCHLORIDE 5 MILLIGRAM(S): 5 TABLET ORAL at 04:14

## 2019-04-11 RX ADMIN — Medication 100 MILLIGRAM(S): at 06:04

## 2019-04-11 RX ADMIN — OXYCODONE HYDROCHLORIDE 5 MILLIGRAM(S): 5 TABLET ORAL at 14:14

## 2019-04-11 RX ADMIN — OXYCODONE HYDROCHLORIDE 5 MILLIGRAM(S): 5 TABLET ORAL at 04:44

## 2019-04-11 RX ADMIN — Medication 975 MILLIGRAM(S): at 14:14

## 2019-04-11 RX ADMIN — Medication 100 MILLIGRAM(S): at 14:00

## 2019-04-11 RX ADMIN — OXYCODONE HYDROCHLORIDE 5 MILLIGRAM(S): 5 TABLET ORAL at 18:41

## 2019-04-11 RX ADMIN — OXYCODONE HYDROCHLORIDE 5 MILLIGRAM(S): 5 TABLET ORAL at 22:54

## 2019-04-11 RX ADMIN — OXYCODONE HYDROCHLORIDE 5 MILLIGRAM(S): 5 TABLET ORAL at 09:27

## 2019-04-11 RX ADMIN — OXYCODONE HYDROCHLORIDE 5 MILLIGRAM(S): 5 TABLET ORAL at 14:53

## 2019-04-11 RX ADMIN — Medication 975 MILLIGRAM(S): at 22:55

## 2019-04-11 RX ADMIN — Medication 100 MILLIGRAM(S): at 22:56

## 2019-04-11 RX ADMIN — SODIUM CHLORIDE 75 MILLILITER(S): 9 INJECTION, SOLUTION INTRAVENOUS at 19:43

## 2019-04-11 RX ADMIN — Medication 25 GM/HR: at 07:40

## 2019-04-11 RX ADMIN — Medication 975 MILLIGRAM(S): at 00:34

## 2019-04-11 RX ADMIN — Medication 100 MILLIGRAM(S): at 18:41

## 2019-04-11 RX ADMIN — Medication 975 MILLIGRAM(S): at 06:04

## 2019-04-11 NOTE — DISCHARGE NOTE OB - PATIENT PORTAL LINK FT
You can access the [facility] Patient Portal, offered by [facility], by registering with the following website: [email or web address]

## 2019-04-11 NOTE — DISCHARGE NOTE OB - MEDICATION SUMMARY - MEDICATIONS TO TAKE
I will START or STAY ON the medications listed below when I get home from the hospital:    ibuprofen 600 mg oral tablet  -- 1 tab(s) by mouth every 6 hours, As needed, Moderate pain or cramping  -- Indication: For pain    acetaminophen 325 mg oral tablet  -- 3 tab(s) by mouth every 6 hours  -- Indication: For pain    ferrous sulfate 325 mg (65 mg elemental iron) oral tablet  -- 1 tab(s) by mouth 3 times a day  -- Indication: For anemia    docusate sodium 100 mg oral capsule  -- 1 cap(s) by mouth 2 times a day, As Needed -for constipation   -- Indication: For constipation

## 2019-04-11 NOTE — PROGRESS NOTE ADULT - ASSESSMENT
PPD#1 s/p , PPH, Anemia, S/P IOL for PEC, Short Cervix, Hx of PTL/PPROM/PTD, Hx of PEC, Discoid Lupus, Grand Multiparity, AMA, Non-compliant    Pt. had a precipitous  within two hours from closed to fully dilated and delivered  Delivery was complicated by PPH of 700 cc blood loss  Hb decreased to 7.4 this am from 10.6 on admission  Pt. is asymptomatic of Anemia  Repeat CBC at 1000 am  If, Hb is 7.0 or less, will consider transfusion  BP stable  Continue Magnesium for total 24 hours  No S/S of Mag. Tox.  Continue care PPD#1 s/p , PPH, Anemia, S/P IOL for PEC, Short Cervix, Hx of PTL/PPROM/PTD, Hx of PEC, Discoid Lupus, Grand Multiparity, AMA, Non-compliant    Pt. had a precipitous  within two hours from closed to fully dilated and delivered  Delivery was complicated by PPH of 700 cc blood loss  Hb decreased to 7.4 this am from 10.6 on admission  Pt. is asymptomatic of Anemia  Repeat CBC at 1000 am  If, Hb is 7.0 or less, will consider transfusion  BP stable  Continue Magnesium for total 24 hours  No S/S of Mag. Tox.  On Abx. Gent x1 dose and Clinda x 24 hrs for manual exploration of the uterus  Continue care

## 2019-04-11 NOTE — DISCHARGE NOTE OB - HOSPITAL COURSE
Patient was admitted for induction of labor for preeclampsia. She underwent vaginal delivery of a viable male. She was diagnosed and treated for postpartum hemorrhage and anemia of blood loss. Patient remained stable and was discharged home on postpartum day 2, to follow up with obstetrician

## 2019-04-11 NOTE — PROGRESS NOTE ADULT - ASSESSMENT
A/P: 37yo PPD#1 s/p  c/b sPEC on Mg and  2/2 atony. Hct 28->22.9, downtrended appropriately. Pt has no clinical sxs of anemia at this time. VSS.

## 2019-04-11 NOTE — DISCHARGE NOTE OB - CARE PLAN
Principal Discharge DX:	Normal vaginal delivery  Goal:	complete recovery  Assessment and plan of treatment:	regular diet, activities as tolerated, nothing per vagina. Follow up with obstetrician for postpartum visit  Secondary Diagnosis:	Postoperative anemia due to acute blood loss  Goal:	resolution of anemia

## 2019-04-11 NOTE — DISCHARGE NOTE OB - PLAN OF CARE
complete recovery regular diet, activities as tolerated, nothing per vagina. Follow up with obstetrician for postpartum visit resolution of anemia

## 2019-04-11 NOTE — DISCHARGE NOTE OB - MEDICATION SUMMARY - MEDICATIONS TO STOP TAKING
I will STOP taking the medications listed below when I get home from the hospital:    ibuprofen 600 mg oral tablet  -- 1 tab(s) by mouth every 6 hours, As needed, Moderate pain or cramping    Iron 100 Plus oral tablet  -- orally 3 times a day    aspirin 81 mg oral tablet  -- 1 tab(s) by mouth once a day

## 2019-04-11 NOTE — DISCHARGE NOTE OB - CARE PROVIDER_API CALL
Franci Walter ()  Obstetrics and Gynecology  [address] [place] [address]  Phone: [phone]  Fax: [phone]  Follow Up Time:

## 2019-04-12 VITALS
RESPIRATION RATE: 18 BRPM | TEMPERATURE: 98 F | HEART RATE: 75 BPM | SYSTOLIC BLOOD PRESSURE: 126 MMHG | OXYGEN SATURATION: 99 % | DIASTOLIC BLOOD PRESSURE: 74 MMHG

## 2019-04-12 LAB
HCT VFR BLD CALC: 23.1 % — LOW (ref 34.5–45)
HGB BLD-MCNC: 7.4 G/DL — LOW (ref 11.5–15.5)
MCHC RBC-ENTMCNC: 31.8 PG — SIGNIFICANT CHANGE UP (ref 27–34)
MCHC RBC-ENTMCNC: 32 % — SIGNIFICANT CHANGE UP (ref 32–36)
MCV RBC AUTO: 99.1 FL — SIGNIFICANT CHANGE UP (ref 80–100)
NRBC # FLD: 0 K/UL — SIGNIFICANT CHANGE UP (ref 0–0)
PLATELET # BLD AUTO: 229 K/UL — SIGNIFICANT CHANGE UP (ref 150–400)
PMV BLD: 9.4 FL — SIGNIFICANT CHANGE UP (ref 7–13)
RBC # BLD: 2.33 M/UL — LOW (ref 3.8–5.2)
RBC # FLD: 13.2 % — SIGNIFICANT CHANGE UP (ref 10.3–14.5)
WBC # BLD: 10.34 K/UL — SIGNIFICANT CHANGE UP (ref 3.8–10.5)
WBC # FLD AUTO: 10.34 K/UL — SIGNIFICANT CHANGE UP (ref 3.8–10.5)

## 2019-04-12 RX ADMIN — OXYCODONE HYDROCHLORIDE 5 MILLIGRAM(S): 5 TABLET ORAL at 17:37

## 2019-04-12 RX ADMIN — Medication 975 MILLIGRAM(S): at 00:41

## 2019-04-12 RX ADMIN — Medication 975 MILLIGRAM(S): at 13:39

## 2019-04-12 RX ADMIN — Medication 100 MILLIGRAM(S): at 05:29

## 2019-04-12 RX ADMIN — Medication 325 MILLIGRAM(S): at 13:10

## 2019-04-12 RX ADMIN — OXYCODONE HYDROCHLORIDE 5 MILLIGRAM(S): 5 TABLET ORAL at 13:39

## 2019-04-12 RX ADMIN — Medication 325 MILLIGRAM(S): at 05:29

## 2019-04-12 RX ADMIN — OXYCODONE HYDROCHLORIDE 5 MILLIGRAM(S): 5 TABLET ORAL at 13:09

## 2019-04-12 RX ADMIN — Medication 250 MILLIGRAM(S): at 00:40

## 2019-04-12 RX ADMIN — OXYCODONE HYDROCHLORIDE 5 MILLIGRAM(S): 5 TABLET ORAL at 03:54

## 2019-04-12 RX ADMIN — OXYCODONE HYDROCHLORIDE 5 MILLIGRAM(S): 5 TABLET ORAL at 08:59

## 2019-04-12 RX ADMIN — Medication 1 TABLET(S): at 13:10

## 2019-04-12 RX ADMIN — Medication 975 MILLIGRAM(S): at 13:09

## 2019-04-12 RX ADMIN — OXYCODONE HYDROCHLORIDE 5 MILLIGRAM(S): 5 TABLET ORAL at 00:41

## 2019-04-12 RX ADMIN — Medication 500 MILLIGRAM(S): at 05:29

## 2019-04-12 RX ADMIN — Medication 975 MILLIGRAM(S): at 05:28

## 2019-04-12 RX ADMIN — OXYCODONE HYDROCHLORIDE 5 MILLIGRAM(S): 5 TABLET ORAL at 09:29

## 2019-04-12 RX ADMIN — Medication 975 MILLIGRAM(S): at 06:30

## 2019-04-12 RX ADMIN — OXYCODONE HYDROCHLORIDE 5 MILLIGRAM(S): 5 TABLET ORAL at 04:56

## 2019-04-12 NOTE — PROGRESS NOTE ADULT - ASSESSMENT
A/P: 39yo PPD#2 s/p  c/b sPEC now s/p Mg. BPs controlled and pt has no symptoms of severe features at this time.  2/ atony; Hct stable and being trended.

## 2019-04-12 NOTE — PROGRESS NOTE ADULT - PROBLEM SELECTOR PLAN 1
- f/u am CBC  - monitor BPs  - Pain well controlled, continue current pain regimen  - Increase ambulation, SCDs when not ambulating  - Continue regular diet  - Discharge planning     Nati Oropeza PGY1
- d/c Mg 24h postpartum  - monitor BPs  - Pain well controlled, continue current pain regimen  - Increase ambulation, SCDs when not ambulating  - Continue regular diet    Nati Oropeza PGY1

## 2019-04-12 NOTE — PROGRESS NOTE ADULT - SUBJECTIVE AND OBJECTIVE BOX
OB Progress Note:  PPD#2    S: 39yo PPD#2 s/p . Patient feels well. Pain is well controlled. She is tolerating a regular diet and passing flatus. She is voiding spontaneously, and ambulating without difficulty. Denies CP/SOB. Denies lightheadedness/dizziness. Denies N/V. No blurry visio nor headaches.    O:  Vitals:   Vital Signs Last 24 Hrs  T(C): 36.5 (2019 05:23), Max: 37.2 (2019 18:00)  T(F): 97.7 (2019 05:23), Max: 98.9 (2019 18:00)  HR: 75 (2019 05:23) (72 - 86)  BP: 126/74 (2019 05:23) (115/60 - 150/75)  BP(mean): --  RR: 18 (2019 05:23) (17 - 20)  SpO2: 99% (2019 05:23) (99% - 100%)    MEDICATIONS  (STANDING):  acetaminophen   Tablet .. 975 milliGRAM(s) Oral every 6 hours  ascorbic acid 500 milliGRAM(s) Oral two times a day  diphtheria/tetanus/pertussis (acellular) Vaccine (ADAcel) 0.5 milliLiter(s) IntraMuscular once  docusate sodium 100 milliGRAM(s) Oral two times a day  ferrous    sulfate 325 milliGRAM(s) Oral three times a day  ibuprofen  Tablet. 600 milliGRAM(s) Oral every 6 hours  oxyCODONE    IR 5 milliGRAM(s) Oral every 3 hours  prenatal multivitamin 1 Tablet(s) Oral daily  sodium chloride 0.9% lock flush 3 milliLiter(s) IV Push every 8 hours  sodium chloride 0.9% lock flush 3 milliLiter(s) IV Push every 8 hours    MEDICATIONS  (PRN):  dibucaine 1% Ointment 1 Application(s) Topical every 4 hours PRN Perineal Discomfort  diphenhydrAMINE 25 milliGRAM(s) Oral every 6 hours PRN Itching  glycerin Suppository - Adult 1 Suppository(s) Rectal at bedtime PRN Constipation  hydrocortisone 1% Cream 1 Application(s) Topical every 4 hours PRN perineal discomfort  lanolin Ointment 1 Application(s) Topical every 6 hours PRN Sore Nipples  magnesium hydroxide Suspension 30 milliLiter(s) Oral two times a day PRN Constipation  oxyCODONE    IR 5 milliGRAM(s) Oral every 4 hours PRN Severe Pain (7 -10)  pramoxine 1%/zinc 5% Cream 1 Application(s) Topical every 4 hours PRN perineal discomfort  simethicone 80 milliGRAM(s) Chew every 6 hours PRN Gas  witch hazel Pads 1 Application(s) Topical every 4 hours PRN Perineal Discomfort      Labs:  Blood type: A Positive  Rubella IgG: RPR: Negative                          7.4<L>   10.34 >-----------< 229    (  @ 05:13 )             23.1<L>                        7.5<L>   10.21 >-----------< 209    (  @ 10:25 )             22.5<L>                        7.4<L>   11.91<H> >-----------< 185    (  @ 05:00 )             22.9<L>                        9.5<L>   17.62<H> >-----------< 252    ( 04-10 @ 22:20 )             28.9<L>                        10.6<L>   10.53<H> >-----------< 277    ( 04-10 @ 05:45 )             33.3<L>                        10.1<L>   10.22 >-----------< 259    (  @ 15:33 )             31.7<L>    04-10-19 @ 05:45      136  |  104  |  7   ----------------------------<  91  4.2   |  17<L>  |  0.64    19 @ 15:33      136  |  104  |  10  ----------------------------<  69<L>  4.3   |  21<L>  |  0.68        Ca    7.8<L>      10 Apr 2019 05:45  Ca    9.0      2019 15:33  Mg     3.6<H>     04-11  Mg     3.5<H>     04-11  Mg     3.6<H>     04-11  Mg     4.4<H>     04-10  Mg     5.6<H>     04-10  Mg     6.7<H>     04-10  Mg     6.2<H>     04-10  Mg     4.8<H>     04-10    TPro  7.3  /  Alb  3.4  /  TBili  0.2  /  DBili  x   /  AST  21  /  ALT  13  /  AlkPhos  166<H>  04-10-19 @ 05:45  TPro  7.1  /  Alb  3.6  /  TBili  0.2  /  DBili  x   /  AST  20  /  ALT  14  /  AlkPhos  152<H>  19 @ 15:33          Physical Exam:  General: NAD  Abdomen: soft, non-tender, non-distended, fundus firm  Vaginal: Lochia wnl  Extremities: No erythema/edema
Anesthesia Post-op Note    Patient s/p  with epidural yesterday.     No residual anesthetic issues or complications noted.
OB Progress Note:  PPD#1    S: 37yo  PPD#1 s/p . Patient feels well. Pain is well controlled. She is tolerating a regular diet and passing flatus. She is voiding spontaneously, and ambulating without difficulty. Denies CP/SOB. Denies lightheadedness/dizziness. Denies N/V. Denies blurry vision or headaches.    O:  Vitals:  Vital Signs Last 24 Hrs  T(C): 36.7 (2019 05:55), Max: 36.9 (10 Apr 2019 21:05)  T(F): 98.1 (2019 05:55), Max: 98.5 (2019 03:50)  HR: 72 (2019 05:55) (72 - 169)  BP: 123/66 (2019 05:55) (110/61 - 193/85)  BP(mean): --  RR: 18 (2019 05:55) (17 - 18)  SpO2: 100% (2019 05:55) (86% - 100%)    MEDICATIONS  (STANDING):  acetaminophen   Tablet .. 975 milliGRAM(s) Oral every 6 hours  clindamycin IVPB      clindamycin IVPB 900 milliGRAM(s) IV Intermittent every 8 hours  diphtheria/tetanus/pertussis (acellular) Vaccine (ADAcel) 0.5 milliLiter(s) IntraMuscular once  gentamicin   IVPB 280 milliGRAM(s) IV Intermittent every 24 hours  ibuprofen  Tablet. 600 milliGRAM(s) Oral every 6 hours  lactated ringers. 1000 milliLiter(s) (75 mL/Hr) IV Continuous <Continuous>  magnesium sulfate Infusion 1 Gm/Hr (25 mL/Hr) IV Continuous <Continuous>  oxyCODONE    IR 5 milliGRAM(s) Oral every 3 hours  prenatal multivitamin 1 Tablet(s) Oral daily  sodium chloride 0.9% lock flush 3 milliLiter(s) IV Push every 8 hours  sodium chloride 0.9% lock flush 3 milliLiter(s) IV Push every 8 hours      Labs:  Blood type: A Positive  Rubella IgG: RPR: Negative                          7.4<L>   11.91<H> >-----------< 185    (  @ 05:00 )             22.9<L>                        9.5<L>   17.62<H> >-----------< 252    ( 04-10 @ 22:20 )             28.9<L>                        10.6<L>   10.53<H> >-----------< 277    ( 04-10 @ 05:45 )             33.3<L>                        10.1<L>   10.22 >-----------< 259    (  @ 15:33 )             31.7<L>    04-10-19 @ 05:45      136  |  104  |  7   ----------------------------<  91  4.2   |  17<L>  |  0.64    19 @ 15:33      136  |  104  |  10  ----------------------------<  69<L>  4.3   |  21<L>  |  0.68        Ca    7.8<L>      10 Apr 2019 05:45  Ca    9.0      2019 15:33  Mg     3.6<H>     04-11  Mg     4.4<H>     04-10  Mg     5.6<H>     04-10  Mg     6.7<H>     04-10  Mg     6.2<H>     04-10  Mg     4.8<H>     0410    TPro  7.3  /  Alb  3.4  /  TBili  0.2  /  DBili  x   /  AST  21  /  ALT  13  /  AlkPhos  166<H>  04-10-19 @ 05:45  TPro  7.1  /  Alb  3.6  /  TBili  0.2  /  DBili  x   /  AST  20  /  ALT  14  /  AlkPhos  152<H>  19 @ 15:33          Physical Exam:  General: NAD  Abdomen: soft, non-tender, non-distended, fundus firm  Vaginal: Lochia wnl  Extremities: No erythema/edema
OB attending: PPD#1 s/p , PPH, Anemia, S/P IOL for PEC, Short Cervix, Hx of PTL/PPROM/PTD, Hx of PEC, Discoid Lupus, Grand Multiparity, AMA, Non-compliant    Patient was seen and evaluated at bedside.  Patient's pain is well controlled with PO pain meds. Patient denies headache, blurry vision, RUQ pain, dizziness, chest pain, palpitations, shortness of breath, nausea, vomiting, heavy vaginal bleeding or perineal discomfort. Patient has been ambulating without assistance, voiding spontaneously, tolerating diet, and is breastfeeding.    Physical Exam:  Vital Signs Last 24 Hrs  T(C): 36.7 (2019 05:55), Max: 36.9 (10 Apr 2019 21:05)  T(F): 98.1 (2019 05:55), Max: 98.5 (2019 03:50)  HR: 72 (2019 05:55) (72 - 169)  BP: 123/66 (2019 05:55) (110/61 - 193/85)  BP(mean): --  RR: 18 (2019 05:55) (17 - 18)  SpO2: 100% (2019 05:55) (86% - 100%)  I&O's Summary    10 Apr 2019 07:01  -  2019 07:00  --------------------------------------------------------  IN: 2625 mL / OUT: 3300 mL / NET: -675 mL        NAD, A+0 x 3  Breasts: soft, nontender, no palpable masses, nipples intact and everted  CVS: Positive S1S2, R/R/R  Lungs: CTA B/L, No W/R/R  Abd:  soft, nontender, nondistended, no rebound or guarding, uterus firm at midline,   : lochia WNL  Extremities: no edema or calf tenderness bilaterally                        7.4    11.91 )-----------( 185      ( 2019 05:00 )             22.9                         9.5    17.62 )-----------( 252      ( 10 Apr 2019 22:20 )             28.9                         10.6   10.53 )-----------( 277      ( 10 Apr 2019 05:45 )             33.3                         10.1   10.22 )-----------( 259      ( 2019 15:33 )             31.7     04-10    136  |  104  |  7   ----------------------------<  91  4.2   |  17<L>  |  0.64    Ca    7.8<L>      10 Apr 2019 05:45  Mg     3.6     04-11    TPro  7.3  /  Alb  3.4  /  TBili  0.2  /  DBili  x   /  AST  21  /  ALT  13  /  AlkPhos  166<H>  04-10    PT/INR - ( 10 Apr 2019 05:45 )   PT: 9.7 SEC;   INR: 0.86          PTT - ( 10 Apr 2019 22:20 )  PTT:27.7 SEC

## 2019-06-20 ENCOUNTER — EMERGENCY (EMERGENCY)
Facility: HOSPITAL | Age: 39
LOS: 1 days | Discharge: HOME | End: 2019-06-20
Attending: EMERGENCY MEDICINE | Admitting: EMERGENCY MEDICINE
Payer: MEDICAID

## 2019-06-20 VITALS
SYSTOLIC BLOOD PRESSURE: 139 MMHG | TEMPERATURE: 99 F | DIASTOLIC BLOOD PRESSURE: 72 MMHG | OXYGEN SATURATION: 96 % | HEART RATE: 96 BPM | RESPIRATION RATE: 18 BRPM

## 2019-06-20 VITALS
OXYGEN SATURATION: 100 % | TEMPERATURE: 99 F | RESPIRATION RATE: 16 BRPM | HEART RATE: 87 BPM | DIASTOLIC BLOOD PRESSURE: 120 MMHG | SYSTOLIC BLOOD PRESSURE: 187 MMHG

## 2019-06-20 DIAGNOSIS — I10 ESSENTIAL (PRIMARY) HYPERTENSION: ICD-10-CM

## 2019-06-20 DIAGNOSIS — Z98.890 OTHER SPECIFIED POSTPROCEDURAL STATES: Chronic | ICD-10-CM

## 2019-06-20 LAB
ALBUMIN SERPL ELPH-MCNC: 5.1 G/DL — HIGH (ref 3.3–5)
ALP SERPL-CCNC: 65 U/L — SIGNIFICANT CHANGE UP (ref 40–120)
ALT FLD-CCNC: 14 U/L — SIGNIFICANT CHANGE UP (ref 4–33)
ANION GAP SERPL CALC-SCNC: 11 MMO/L — SIGNIFICANT CHANGE UP (ref 7–14)
APPEARANCE UR: CLEAR — SIGNIFICANT CHANGE UP
APTT BLD: 35 SEC — SIGNIFICANT CHANGE UP (ref 27.5–36.3)
AST SERPL-CCNC: 22 U/L — SIGNIFICANT CHANGE UP (ref 4–32)
BILIRUB SERPL-MCNC: 0.5 MG/DL — SIGNIFICANT CHANGE UP (ref 0.2–1.2)
BILIRUB UR-MCNC: NEGATIVE — SIGNIFICANT CHANGE UP
BLOOD UR QL VISUAL: NEGATIVE — SIGNIFICANT CHANGE UP
BUN SERPL-MCNC: 8 MG/DL — SIGNIFICANT CHANGE UP (ref 7–23)
CALCIUM SERPL-MCNC: 10.1 MG/DL — SIGNIFICANT CHANGE UP (ref 8.4–10.5)
CHLORIDE SERPL-SCNC: 101 MMOL/L — SIGNIFICANT CHANGE UP (ref 98–107)
CO2 SERPL-SCNC: 26 MMOL/L — SIGNIFICANT CHANGE UP (ref 22–31)
COLOR SPEC: SIGNIFICANT CHANGE UP
CREAT SERPL-MCNC: 0.84 MG/DL — SIGNIFICANT CHANGE UP (ref 0.5–1.3)
FIBRINOGEN PPP-MCNC: 405 MG/DL — SIGNIFICANT CHANGE UP (ref 350–510)
GLUCOSE SERPL-MCNC: 102 MG/DL — HIGH (ref 70–99)
GLUCOSE UR-MCNC: NEGATIVE — SIGNIFICANT CHANGE UP
HCT VFR BLD CALC: 40.8 % — SIGNIFICANT CHANGE UP (ref 34.5–45)
HGB BLD-MCNC: 13.3 G/DL — SIGNIFICANT CHANGE UP (ref 11.5–15.5)
INR BLD: 1.1 — SIGNIFICANT CHANGE UP (ref 0.88–1.17)
KETONES UR-MCNC: NEGATIVE — SIGNIFICANT CHANGE UP
LDH SERPL L TO P-CCNC: 162 U/L — SIGNIFICANT CHANGE UP (ref 135–225)
LEUKOCYTE ESTERASE UR-ACNC: NEGATIVE — SIGNIFICANT CHANGE UP
MCHC RBC-ENTMCNC: 31.1 PG — SIGNIFICANT CHANGE UP (ref 27–34)
MCHC RBC-ENTMCNC: 32.6 % — SIGNIFICANT CHANGE UP (ref 32–36)
MCV RBC AUTO: 95.6 FL — SIGNIFICANT CHANGE UP (ref 80–100)
NITRITE UR-MCNC: NEGATIVE — SIGNIFICANT CHANGE UP
NRBC # FLD: 0 K/UL — SIGNIFICANT CHANGE UP (ref 0–0)
PH UR: 7 — SIGNIFICANT CHANGE UP (ref 5–8)
PLATELET # BLD AUTO: 263 K/UL — SIGNIFICANT CHANGE UP (ref 150–400)
PMV BLD: 8.8 FL — SIGNIFICANT CHANGE UP (ref 7–13)
POTASSIUM SERPL-MCNC: 3.7 MMOL/L — SIGNIFICANT CHANGE UP (ref 3.5–5.3)
POTASSIUM SERPL-SCNC: 3.7 MMOL/L — SIGNIFICANT CHANGE UP (ref 3.5–5.3)
PROT SERPL-MCNC: 8.5 G/DL — HIGH (ref 6–8.3)
PROT UR-MCNC: 5.2 MG/DL — SIGNIFICANT CHANGE UP
PROT UR-MCNC: NEGATIVE — SIGNIFICANT CHANGE UP
PROTHROM AB SERPL-ACNC: 12.6 SEC — SIGNIFICANT CHANGE UP (ref 9.8–13.1)
RBC # BLD: 4.27 M/UL — SIGNIFICANT CHANGE UP (ref 3.8–5.2)
RBC # FLD: 11.3 % — SIGNIFICANT CHANGE UP (ref 10.3–14.5)
SODIUM SERPL-SCNC: 138 MMOL/L — SIGNIFICANT CHANGE UP (ref 135–145)
SP GR SPEC: 1.01 — SIGNIFICANT CHANGE UP (ref 1–1.04)
URATE SERPL-MCNC: 5.3 MG/DL — SIGNIFICANT CHANGE UP (ref 2.5–7)
UROBILINOGEN FLD QL: NORMAL — SIGNIFICANT CHANGE UP
WBC # BLD: 7.39 K/UL — SIGNIFICANT CHANGE UP (ref 3.8–10.5)
WBC # FLD AUTO: 7.39 K/UL — SIGNIFICANT CHANGE UP (ref 3.8–10.5)

## 2019-06-20 PROCEDURE — 99284 EMERGENCY DEPT VISIT MOD MDM: CPT

## 2019-06-20 RX ORDER — HYDRALAZINE HCL 50 MG
10 TABLET ORAL ONCE
Refills: 0 | Status: COMPLETED | OUTPATIENT
Start: 2019-06-20 | End: 2019-06-20

## 2019-06-20 RX ORDER — MAGNESIUM SULFATE 500 MG/ML
2 VIAL (ML) INJECTION
Qty: 40 | Refills: 0 | Status: DISCONTINUED | OUTPATIENT
Start: 2019-06-20 | End: 2019-06-20

## 2019-06-20 RX ORDER — MAGNESIUM SULFATE 500 MG/ML
4 VIAL (ML) INJECTION ONCE
Refills: 0 | Status: DISCONTINUED | OUTPATIENT
Start: 2019-06-20 | End: 2019-06-20

## 2019-06-20 RX ADMIN — Medication 10 MILLIGRAM(S): at 18:34

## 2019-06-20 RX ADMIN — Medication 10 MILLIGRAM(S): at 18:06

## 2019-06-20 NOTE — ED PROVIDER NOTE - NS ED ROS FT
GENERAL: no fever, chills  HEENT: no cough, congestion, odynophagia, dysphagia  CARDIAC: no chest pain, palpitations, lightheadedness  PULM: no dyspnea, wheezing   GI: no abdominal pain, nausea, vomiting, diarrhea, constipation, melena, hematochezia  : no urinary dysuria, frequency, incontinence, hematuria  NEURO: no headache, motor weakness, sensory changes  MSK: no joint or muscle pain  SKIN: no rashes, hives  HEME: no active bleeding, bruising

## 2019-06-20 NOTE — ED ADULT NURSE NOTE - OBJECTIVE STATEMENT
Pt A/Ox4 states she is 8 weeks post partum and was at her MDs office today, Pt states her BP was noted to be over 200 and they sent her to the ED for eval. Pt denies sx, denies h/a, visual changes, dizziness or lightheadedness. Pt states she has a hx of HTN with her past births. Pt appears well, breathing well and non labored, skin warm and dry. Denies CP or SOB.

## 2019-06-20 NOTE — ED PROVIDER NOTE - CLINICAL SUMMARY MEDICAL DECISION MAKING FREE TEXT BOX
38F  8 weeks post-partum presenting in the ED with elevated BPs outpatient. Patient likely has new onset HTN. Low suspicion for pre-eclampsia given timeline and lack of symptoms. Plan - basics, ua, likely d/c with ob follow up. 38F  8 weeks post-partum presenting in the ED with elevated BPs outpatient. Patient likely has new onset HTN. Low suspicion for pre-eclampsia given timeline and lack of symptoms. Plan - basics, ua, likely d/c with ob follow up.  Meliza: Hypertension for weeks after giving birth. being followed as outpt. on labetalol and ace. Concern that this is preeclampsia. Will get OB involved.

## 2019-06-20 NOTE — ED PROVIDER NOTE - ATTENDING CONTRIBUTION TO CARE
I performed a history and physical exam of the patient and discussed their management with the resident and /or advanced care provider. I reviewed the resident and /or ACP's note and agree with the documented findings and plan of care. My medical decision making and observations are found above.  Lungs clear, no headache, nl neuro exam.

## 2019-06-20 NOTE — ED PROVIDER NOTE - NSFOLLOWUPINSTRUCTIONS_ED_ALL_ED_FT
Your diagnosis: hypertension    Discharge instructions:    - Please follow up with your Primary Care Doctor. Please take your blood pressure medications.    - Others:    - Be sure to return to the ED if you develop new or worsening symptoms. Specific signs and symptoms to be vigilant of: fever or chills, chest pain, difficulty breathing, palpitations, loss of consciousness, headache, vision changes, slurred speech, difficulty swallowing or drooling, facial droop, weakness in the arms or legs, numbness or tingling, abdominal pain, nausea or vomiting, diarrhea, constipation, blood in the stool or urine, pain on urination, difficulty urinating.

## 2019-06-20 NOTE — ED PROVIDER NOTE - PROGRESS NOTE DETAILS
Arthur: ob consulted to see patient. No concern for pre-eclampsia given that patient is 6 weeks post partum. Will give hydralazine, but hold off on magnesium. Patient likely has new diagnosis of chronic HTN. rajinder: asymptomatic HTN. number high but not felt to be preeclampsea. will d/c with follow up.

## 2019-06-20 NOTE — ED ADULT NURSE NOTE - NSIMPLEMENTINTERV_GEN_ALL_ED
Implemented All Universal Safety Interventions:  [place] to call system. Call bell, personal items and telephone within reach. Instruct patient to call for assistance. Room bathroom lighting operational. Non-slip footwear when patient is off stretcher. Physically safe environment: no spills, clutter or unnecessary equipment. Stretcher in lowest position, wheels locked, appropriate side rails in place.

## 2019-06-20 NOTE — CONSULT NOTE ADULT - SUBJECTIVE AND OBJECTIVE BOX
HPI    38y  s/p  April 10, 2019 sent in from office with elevated blood pressures at initial postpartum visit. Pregnancy c/b sPEC on Mg. Prescribed medication for blood pressure control, which patient never took. Denies any CP, SOB, HA, blurry vision, N/V, etc.    OB/GYN HISTORY:   PAST MEDICAL & SURGICAL HISTORY:  Normal vaginal delivery:  36week           c/b sPEC/Mg    c/b sPEC/Mg  Lupus: skin? dx 2013  History of bunionectomy of left great toe:     Allergies  hazelnut (Rash)  sulfa drugs (Anaphylaxis)    MEDICATIONS: None  SOCIAL HISTORY: Denies x3  Psych: depression, ADHD follows with a therapist, no Rx    REVIEW OF SYSTEMS:    CONSTITUTIONAL: No weakness, fevers or chills  EYES/ENT: No visual changes;  No vertigo or throat pain   NECK: No pain or stiffness  RESPIRATORY: No cough, wheezing, hemoptysis; No shortness of breath  CARDIOVASCULAR: No chest pain or palpitations  GASTROINTESTINAL: No abdominal or epigastric pain. No nausea, vomiting, or hematemesis; No diarrhea or constipation. No melena or hematochezia.  GENITOURINARY: No dysuria, frequency or hematuria  NEUROLOGICAL: No numbness or weakness  SKIN: No itching, burning, rashes, or lesions   All other review of systems is negative unless indicated above.    Name of GYN Physician: Dr. Walter    Vital Signs Last 24 Hrs  T(C): 37.1 (2019 16:14), Max: 37.1 (2019 16:14)  T(F): 98.8 (2019 16:14), Max: 98.8 (2019 16:14)  HR: 95 (2019 18:20) (80 - 95)  BP: 184/92 (2019 19:09) (184/92 - 214/112)  BP(mean): --  RR: 18 (2019 18:20) (16 - 18)  SpO2: 100% (2019 18:20) (100% - 100%)    PHYSICAL EXAM:  Constitutional: alert and oriented x 3  Respiratory: clear  Cardiovascular: regular rate and rhythm  Gastrointestinal: soft, non tender, + bowel sounds. No hepatosplenomegaly, no palpable masses    LABS:                        13.3   7.39  )-----------( 263      ( 2019 17:15 )             40.8     06-20    138  |  101  |  8   ----------------------------<  102<H>  3.7   |  26  |  0.84    Ca    10.1      2019 17:15    TPro  8.5<H>  /  Alb  5.1<H>  /  TBili  0.5  /  DBili  x   /  AST  22  /  ALT  14  /  AlkPhos  65  06-20    PT/INR - ( 2019 18:05 )   PT: 12.6 SEC;   INR: 1.10          PTT - ( 2019 18:05 )  PTT:35.0 SEC  Urinalysis Basic - ( 2019 18:05 )    Color: LIGHT YELLOW / Appearance: CLEAR / S.012 / pH: 7.0  Gluc: NEGATIVE / Ketone: NEGATIVE  / Bili: NEGATIVE / Urobili: NORMAL   Blood: NEGATIVE / Protein: NEGATIVE / Nitrite: NEGATIVE   Leuk Esterase: NEGATIVE / RBC: x / WBC x   Sq Epi: x / Non Sq Epi: x / Bacteria: x

## 2019-06-20 NOTE — CONSULT NOTE ADULT - ASSESSMENT
Initially treated as presumed postpartum precclampsia with IVP hydralazine. However on further review of patient history, is 10wks postpartum and outside of the period of postpartum PEC. Is likely more a manifestation of chronic hypertensive state. Patient stable, asymptomatic.

## 2019-06-20 NOTE — ED ADULT TRIAGE NOTE - CHIEF COMPLAINT QUOTE
Pt sent in for hypertension after 8 week post partum f/u. Pt endorses minor headache/dizziness, c/o anxiety. Denies CP/SOB. States was given BP meds and non compliant.

## 2019-06-20 NOTE — CONSULT NOTE ADULT - PROBLEM SELECTOR RECOMMENDATION 9
No OB/GYN interventions at this time. Recommend medicine evaluation whether in or outpatient at the discretion of primary team. OB/GYN signing off.    Lyn Baugh, PGY1  D/W Dr. Murillo

## 2019-06-20 NOTE — ED PROVIDER NOTE - OBJECTIVE STATEMENT
38F  8 weeks post-partum presenting in the ED with elevated BPs outpatient. Patient states she was on magnesium during her last pregnancy to prevent seizures. Patient otherwise has no complaints, including headache, changes in vision, chest pain, palpitations, shortness of breath, abdominal pain, nausea, vomiting, motor or sensory changes, changes in urination.

## 2019-06-21 PROBLEM — M32.9 SYSTEMIC LUPUS ERYTHEMATOSUS, UNSPECIFIED: Chronic | Status: ACTIVE | Noted: 2019-04-09

## 2019-07-01 ENCOUNTER — OUTPATIENT (OUTPATIENT)
Dept: OUTPATIENT SERVICES | Facility: HOSPITAL | Age: 39
LOS: 1 days | End: 2019-07-01
Payer: MEDICAID

## 2019-07-01 DIAGNOSIS — Z98.890 OTHER SPECIFIED POSTPROCEDURAL STATES: Chronic | ICD-10-CM

## 2019-07-01 PROCEDURE — G9001: CPT

## 2019-07-08 DIAGNOSIS — Z71.89 OTHER SPECIFIED COUNSELING: ICD-10-CM

## 2019-11-08 NOTE — OB RN DELIVERY SUMMARY - NS_DELIVERYNEONATOLOGIST1_OBGYN_ALL_OB_FT
Pt here as follow up to sonohyst/emb done for PMB.    x 2.  LMP around age 52.  No HRT.  Had sotting early 10/19.  Then 2 weeks ago.  sonohyst last week showed endometrial polyp.  emb benign without polyp fragment.  Spotting daily since sonohyst.  No fever.  No hx of gyne problems.  Not sexually active    PMH - DM, diverticulitis ( no surgery) , HTN, hx cardiac ablation with normal cardiac since, stage 4 kidney failure - stable for 3 years - felt probably due to DM. GERD.  COPD from family hx smoking    PE  WDObese female in NAD  abd - obese.  nontender  Pelvic - ext - normal  Vag - no bleeding at this time  cx - no lesions seen  Uterus - limited by obesity.  Nontender.   Ad - nontender.  No obvious masses    A/P  PMB most likely due to polyp.  Recommend polypectomy  Risks discussed including perforation and infection.  Will need clearance from Dr. Andres.     Dr. Barahona

## 2020-03-01 ENCOUNTER — OUTPATIENT (OUTPATIENT)
Dept: OUTPATIENT SERVICES | Facility: HOSPITAL | Age: 40
LOS: 1 days | End: 2020-03-01
Payer: MEDICAID

## 2020-03-01 DIAGNOSIS — Z98.890 OTHER SPECIFIED POSTPROCEDURAL STATES: Chronic | ICD-10-CM

## 2020-03-01 PROCEDURE — G9005: CPT

## 2020-03-26 ENCOUNTER — EMERGENCY (EMERGENCY)
Facility: HOSPITAL | Age: 40
LOS: 1 days | Discharge: HOME | End: 2020-03-26
Attending: STUDENT IN AN ORGANIZED HEALTH CARE EDUCATION/TRAINING PROGRAM
Payer: COMMERCIAL

## 2020-03-26 VITALS
HEIGHT: 64 IN | SYSTOLIC BLOOD PRESSURE: 181 MMHG | HEART RATE: 87 BPM | TEMPERATURE: 98 F | WEIGHT: 106.92 LBS | RESPIRATION RATE: 18 BRPM | OXYGEN SATURATION: 100 % | DIASTOLIC BLOOD PRESSURE: 93 MMHG

## 2020-03-26 DIAGNOSIS — Z98.890 OTHER SPECIFIED POSTPROCEDURAL STATES: Chronic | ICD-10-CM

## 2020-03-26 PROCEDURE — 99283 EMERGENCY DEPT VISIT LOW MDM: CPT

## 2020-03-26 RX ORDER — OXYCODONE AND ACETAMINOPHEN 5; 325 MG/1; MG/1
1 TABLET ORAL ONCE
Refills: 0 | Status: DISCONTINUED | OUTPATIENT
Start: 2020-03-26 | End: 2020-03-26

## 2020-03-26 RX ORDER — AMOXICILLIN 250 MG/5ML
1 SUSPENSION, RECONSTITUTED, ORAL (ML) ORAL
Qty: 21 | Refills: 0
Start: 2020-03-26 | End: 2020-04-01

## 2020-03-26 RX ORDER — IBUPROFEN 200 MG
1 TABLET ORAL
Qty: 28 | Refills: 0
Start: 2020-03-26 | End: 2020-04-01

## 2020-03-26 RX ORDER — LIDOCAINE 4 G/100G
10 CREAM TOPICAL ONCE
Refills: 0 | Status: COMPLETED | OUTPATIENT
Start: 2020-03-26 | End: 2020-03-26

## 2020-03-26 RX ADMIN — LIDOCAINE 10 MILLILITER(S): 4 CREAM TOPICAL at 04:19

## 2020-03-26 RX ADMIN — OXYCODONE AND ACETAMINOPHEN 1 TABLET(S): 5; 325 TABLET ORAL at 04:18

## 2020-03-26 NOTE — ED ADULT TRIAGE NOTE - NSWEIGHTCALCTOOLDRUG_GEN_A_CORE
1.  Nuclear Sclerotic Cataract OU: Explained how cataracts can effect vision. Recommend clinical observation. The patient was advised to contact us if any change or worsening of vision. 2. Esotropia:3. Amblyopia: Monitor vision limited in affected eye. 4. Continue present contact lens modality. Stop/wear and call if any redness pain or decrease in vision occur.  Glasses change optional. 5.  Refractive error
 used

## 2020-03-26 NOTE — ED ADULT TRIAGE NOTE - CHIEF COMPLAINT QUOTE
Pt AOx3, presents to ED with c/o right sided rear toothache since 2300. States has appointment on 4/1 with dentist for removal, but pain became unbearable tonight.

## 2020-03-26 NOTE — ED PROVIDER NOTE - NS ED ROS FT
Gen: denies fever, chills, fatigue, weight loss  Skin: denies rashes, laceration, bruising  HEENT: +Tooth pain. denies visual changes, ear pain, nasal congestion, throat pain  Respiratory: denies AGUILA, SOB, cough, wheezing  Cardiovascular: denies chest pain, palpitations, diaphoresis, LE edema  GI: denies abdominal pain, n/v/d  Neuro: denies headache, dizziness, weakness, numbness

## 2020-03-26 NOTE — ED PROVIDER NOTE - NSFOLLOWUPINSTRUCTIONS_ED_ALL_ED_FT
- Follow up with your doctor within 2-3 days.   - Return to the ED for any new or worsening symptoms.   - Follow-up with your dental appointment as scheduled  - Complete course of antibiotics as directed  - May take ibuprofen 600mg every 6 hours as needed for pain    Dental Pain    Dental pain (toothache) may be caused by many things including tooth decay (cavities or caries), abscess or infection, or trauma. If you were prescribed an antibiotic medicine, finish all of it even if you start to feel better. Rinsing your mouth with salt water or applying ice to the painful area of your face may help with the pain. Follow up with a dentist is important in ensuring good oral health and preventing the worsening of dental disease.    SEEK IMMEDIATE MEDICAL CARE IF YOU HAVE ANY OF THE FOLLOWING SYMPTOMS: unable to open your mouth, trouble breathing or swallowing, fever, or swelling of the face, neck, or jaw.

## 2020-03-26 NOTE — ED PROVIDER NOTE - ATTENDING CONTRIBUTION TO CARE
38yo female with pmh of HTN, lupus presents to ED c/o tooth pain. Pt has had cracked R upper molar for " a while" but has been putting off getting it fixed due to fear of dentist. Pain worse tonight intermittent. Only taking aleve at home with minimal relief. Pt has appt with dentist to have tooth extracted on April 1st, next wednesday. Pt denies fevers/chills, ha, loc, focal neuro deficits, cp/sob/palp, cough, abd pain/n/v/d, urinary symptoms, recent travel and sick contacts.  pain meds, abx, follow up

## 2020-03-26 NOTE — ED PROVIDER NOTE - OBJECTIVE STATEMENT
38yo F pmhx HTN, lupus presents to ED c/o tooth pain. Pt has had cracked R upper molar for " a while" but has been putting off getting it fixed due to fear of dentist. Pt notes pain worsened this evening approx 2300pm and now pain is coming in "waves". Pain is tolerable at this time. Only taking aleve at home with minimal relief. Pt has appt with dentist to have tooth extracted on April 1st, next wednesday. Denies fever, chills, headache, throat pain, CP, SOB.

## 2020-03-26 NOTE — ED PROVIDER NOTE - NSFOLLOWUPCLINICS_GEN_ALL_ED_FT
[facility] [place] [address]  Phone: [phone]  Fax:   Follow Up Time:

## 2020-03-26 NOTE — ED PROVIDER NOTE - PHYSICAL EXAMINATION
Const: Awake, alert and oriented. In no acute distress. Well appearing.  HEENT: NC/AT. Moist mucous membranes. #2 tooth cracked. No gumline erythema or fluctuance. No ludwigs angina. Non-ttp. Oropharynx clear, uvula midline  Eyes: No scleral icterus. EOMI.  Cardiac: Regular rate and regular rhythm. +S1/S2. Peripheral pulses 2+ and symmetric. No LE edema.  Resp: Speaking in full sentences. No evidence of respiratory distress. No wheezes, rales or rhonchi.  Skin: No rashes, abrasions or lacerations.  Neuro: Awake, alert & oriented x 3. Moves all extremities symmetrically.

## 2020-03-26 NOTE — ED PROVIDER NOTE - PATIENT PORTAL LINK FT
You can access the FollowMyHealth Patient Portal offered by [facility] by registering at the following website: [email or web address]. By joining [employer]’s FollowMyHealth portal, you will also be able to view your health information using other applications (apps) compatible with our system.

## 2020-03-31 DIAGNOSIS — Z71.89 OTHER SPECIFIED COUNSELING: ICD-10-CM

## 2020-05-21 PROBLEM — I10 ESSENTIAL (PRIMARY) HYPERTENSION: Chronic | Status: ACTIVE | Noted: 2020-03-26

## 2020-07-02 ENCOUNTER — APPOINTMENT (OUTPATIENT)
Dept: RHEUMATOLOGY | Facility: CLINIC | Age: 40
End: 2020-07-02
Payer: MEDICAID

## 2020-07-02 PROCEDURE — 99214 OFFICE O/P EST MOD 30 MIN: CPT | Mod: 95

## 2020-07-02 NOTE — PHYSICAL EXAM
[Sclera] : the sclera and conjunctiva were normal [General Appearance - In No Acute Distress] : in no acute distress [General Appearance - Alert] : alert [Impaired Insight] : insight and judgment were intact [Oriented To Time, Place, And Person] : oriented to person, place, and time [Affect] : the affect was normal [FreeTextEntry1] : No visible joint swelling, full ROM in all joints, including right knee\par

## 2020-07-02 NOTE — HISTORY OF PRESENT ILLNESS
[Verbal consent obtained from patient] : the patient, [unfilled] [Home] : at home, [unfilled] , at the time of the visit. [Medical Office: ([facility])___] : at the medical office located in  [Fatigue] : fatigue [Skin Lesions] : skin lesions [FreeTextEntry1] : Initial visit (3/18/18):\par 37 year old female reports that about 3.5 years ago, she developed a lesion on her scalp causing a bald spot.  The lesions progressively worsened, so she saw a dermatologist - a skin biopsy reportedly was c/w discoid lupus.  However, she reports that she was never started on any treatment for it.  Recently, she developed lesions on her chest and upper back.  She saw her PMD, who started her on triamcinolone cream, on which the lesions have improved.  She denies any other complaints.\par \par No F/C, no unintentional weight loss, no night sweats, no oral ulcers, no other rashes, no joint pains, no alopecia, no photosensitivity, no dry eyes/dry mouth, (+) Raynaud symptoms, no focal weakness, no dysphagia\par  [Anorexia] : no anorexia [Weight Loss] : no weight loss [Fever] : no fever [Chills] : no chills [Malaise] : no malaise [Malar Facial Rash] : no malar facial rash [Skin Nodules] : no skin nodules [Oral Ulcers] : no oral ulcers [Dysphagia] : no dysphagia [Dry Mouth] : no dry mouth [Cough] : no cough [Chest Pain] : no chest pain [Shortness of Breath] : no shortness of breath [Joint Warmth] : no joint warmth [Joint Swelling] : no joint swelling [Arthralgias] : no arthralgias [Morning Stiffness] : no morning stiffness [Decreased ROM] : no decreased range of motion [Joint Deformity] : no joint deformity [Falls] : no falls [Difficulty Standing] : no difficulty standing [Difficulty Walking] : no difficulty walking [Dyspnea] : no dyspnea [Myalgias] : no myalgias [Muscle Weakness] : no muscle weakness [Muscle Spasms] : no muscle spasms [Muscle Cramping] : no muscle cramping [Visual Changes] : no visual changes [Eye Pain] : no eye pain [Eye Redness] : no eye redness [Dry Eyes] : no dry eyes

## 2020-07-02 NOTE — ASSESSMENT
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  \par   - Cont topical steroids.\par   - Restart Plaquenil 200mg daily.  Ophtho f/u.\par   - Check labs.\par 2)  Raynaud's:  not currently active as the weather is currently warm.\par 3)  RIght knee pain:  ?trauma, junaid as the pain started while she was exercising.\par   - x-rays of knees.\par   - ibuprofen prn\par   - knee exercises

## 2020-07-06 RX ORDER — HYDROXYCHLOROQUINE SULFATE 200 MG/1
200 TABLET, FILM COATED ORAL DAILY
Qty: 30 | Refills: 5 | Status: ACTIVE | COMMUNITY
Start: 2018-03-20

## 2021-03-25 DIAGNOSIS — Z01.818 ENCOUNTER FOR OTHER PREPROCEDURAL EXAMINATION: ICD-10-CM

## 2021-03-26 ENCOUNTER — APPOINTMENT (OUTPATIENT)
Dept: DISASTER EMERGENCY | Facility: CLINIC | Age: 41
End: 2021-03-26

## 2022-04-25 NOTE — OB RN DELIVERY SUMMARY - NS_BREASTINHOURA_OBGYN_ALL_OB
Chief Complaint   Patient presents with     General Visit     Follow up     Vitals were taken and medications were reconciled.     VIANCA Cowan      
Offered, feeding was successful

## 2022-07-28 ENCOUNTER — APPOINTMENT (OUTPATIENT)
Dept: RHEUMATOLOGY | Facility: CLINIC | Age: 42
End: 2022-07-28

## 2022-07-28 VITALS
BODY MASS INDEX: 17.93 KG/M2 | SYSTOLIC BLOOD PRESSURE: 120 MMHG | HEART RATE: 98 BPM | HEIGHT: 64 IN | DIASTOLIC BLOOD PRESSURE: 78 MMHG | WEIGHT: 105 LBS | RESPIRATION RATE: 17 BRPM | TEMPERATURE: 98 F

## 2022-07-28 PROCEDURE — 99214 OFFICE O/P EST MOD 30 MIN: CPT

## 2022-07-28 NOTE — ASSESSMENT
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  \par   - Cont topical triamcinolone prn\par   - Will hold off on Plaquenil for now, pending derm eval (see below).\par   - Check labs.\par 2)  Raynaud's:  not currently active as the weather is currently warm.\par 3)  RIght knee pain:  ?trauma, junaid as the pain started while she was exercising.  Resolved.\par 4)  Several hyperpigmented patches on RUE and RLE:  unclear etiology\par   - Derm eval (pt reports that she has already been referred)

## 2022-07-28 NOTE — HISTORY OF PRESENT ILLNESS
[Fatigue] : fatigue [Skin Lesions] : skin lesions [FreeTextEntry1] : Feeling fine overall since last visit.  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  No other complaints.  Right knee pain has resolved.\par No F/C, no unintentional weight loss, no night sweats, no oral ulcers, no joint pains, no alopecia, no photosensitivity, no dry eyes/dry mouth, no Raynaud symptoms, no focal weakness, no dysphagia\par  [Anorexia] : no anorexia [Weight Loss] : no weight loss [Malaise] : no malaise [Fever] : no fever [Chills] : no chills [Malar Facial Rash] : no malar facial rash [Skin Nodules] : no skin nodules [Oral Ulcers] : no oral ulcers [Cough] : no cough [Dry Mouth] : no dry mouth [Dysphagia] : no dysphagia [Shortness of Breath] : no shortness of breath [Chest Pain] : no chest pain [Arthralgias] : no arthralgias [Joint Swelling] : no joint swelling [Joint Warmth] : no joint warmth [Joint Deformity] : no joint deformity [Decreased ROM] : no decreased range of motion [Morning Stiffness] : no morning stiffness [Falls] : no falls [Difficulty Standing] : no difficulty standing [Difficulty Walking] : no difficulty walking [Dyspnea] : no dyspnea [Myalgias] : no myalgias [Muscle Weakness] : no muscle weakness [Muscle Spasms] : no muscle spasms [Muscle Cramping] : no muscle cramping [Visual Changes] : no visual changes [Eye Pain] : no eye pain [Eye Redness] : no eye redness [Dry Eyes] : no dry eyes

## 2022-07-28 NOTE — PHYSICAL EXAM
[General Appearance - Alert] : alert [General Appearance - In No Acute Distress] : in no acute distress [Sclera] : the sclera and conjunctiva were normal [Oriented To Time, Place, And Person] : oriented to person, place, and time [Affect] : the affect was normal [Impaired Insight] : insight and judgment were intact [Outer Ear] : the ears and nose were normal in appearance [Oropharynx] : the oropharynx was normal [Neck Appearance] : the appearance of the neck was normal [Neck Cervical Mass (___cm)] : no neck mass was observed [Jugular Venous Distention Increased] : there was no jugular-venous distention [Thyroid Diffuse Enlargement] : the thyroid was not enlarged [Thyroid Nodule] : there were no palpable thyroid nodules [Auscultation Breath Sounds / Voice Sounds] : lungs were clear to auscultation bilaterally [Heart Rate And Rhythm] : heart rate was normal and rhythm regular [Heart Sounds] : normal S1 and S2 [Heart Sounds Gallop] : no gallops [Murmurs] : no murmurs [Heart Sounds Pericardial Friction Rub] : no pericardial rub [Edema] : there was no peripheral edema [Bowel Sounds] : normal bowel sounds [Abdomen Soft] : soft [Abdomen Tenderness] : non-tender [] : no hepato-splenomegaly [Abdomen Mass (___ Cm)] : no abdominal mass palpated [Cervical Lymph Nodes Enlarged Posterior Bilaterally] : posterior cervical [Cervical Lymph Nodes Enlarged Anterior Bilaterally] : anterior cervical [Supraclavicular Lymph Nodes Enlarged Bilaterally] : supraclavicular [No Spinal Tenderness] : no spinal tenderness [FreeTextEntry1] : several erythematous lesions on scalp;  several non-palpable hyperpigmented patches on RUE and RLE [No Focal Deficits] : no focal deficits

## 2023-01-17 ENCOUNTER — APPOINTMENT (OUTPATIENT)
Dept: RHEUMATOLOGY | Facility: CLINIC | Age: 43
End: 2023-01-17
Payer: MEDICAID

## 2023-01-17 VITALS
TEMPERATURE: 98 F | DIASTOLIC BLOOD PRESSURE: 92 MMHG | HEART RATE: 102 BPM | OXYGEN SATURATION: 98 % | SYSTOLIC BLOOD PRESSURE: 150 MMHG | HEIGHT: 64 IN

## 2023-01-17 DIAGNOSIS — M25.561 PAIN IN RIGHT KNEE: ICD-10-CM

## 2023-01-17 DIAGNOSIS — Z79.899 OTHER LONG TERM (CURRENT) DRUG THERAPY: ICD-10-CM

## 2023-01-17 DIAGNOSIS — L93.0 DISCOID LUPUS ERYTHEMATOSUS: ICD-10-CM

## 2023-01-17 DIAGNOSIS — I73.00 RAYNAUD'S SYNDROME W/OUT GANGRENE: ICD-10-CM

## 2023-01-17 PROCEDURE — 99214 OFFICE O/P EST MOD 30 MIN: CPT

## 2023-01-17 RX ORDER — CLOBETASOL PROPIONATE 0.5 MG/ML
0.05 SOLUTION TOPICAL TWICE DAILY
Qty: 1 | Refills: 1 | Status: ACTIVE | COMMUNITY
Start: 2018-03-20 | End: 1900-01-01

## 2023-01-17 NOTE — HISTORY OF PRESENT ILLNESS
[Fatigue] : fatigue [Skin Lesions] : skin lesions [FreeTextEntry1] : Pt reports that she has been experiencing more lesions on her scalp - she says they feel "bumpy".  No pain or pruritus.  Also w/ (+)associated alopecia.   She also c/o itchy "lumps" on her hands, usually lasting 2-3 months at a time.\par Feeling fine overall since last visit.  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  No other complaints.  Right knee pain has resolved.\par No F/C, no unintentional weight loss, no night sweats, no oral ulcers, no joint pains, no alopecia, no photosensitivity, no dry eyes/dry mouth, no Raynaud symptoms, no focal weakness, no dysphagia\par  [Anorexia] : no anorexia [Weight Loss] : no weight loss [Malaise] : no malaise [Fever] : no fever [Chills] : no chills [Malar Facial Rash] : no malar facial rash [Skin Nodules] : no skin nodules [Oral Ulcers] : no oral ulcers [Cough] : no cough [Dry Mouth] : no dry mouth [Dysphagia] : no dysphagia [Shortness of Breath] : no shortness of breath [Chest Pain] : no chest pain [Arthralgias] : no arthralgias [Joint Swelling] : no joint swelling [Joint Warmth] : no joint warmth [Joint Deformity] : no joint deformity [Decreased ROM] : no decreased range of motion [Morning Stiffness] : no morning stiffness [Falls] : no falls [Difficulty Standing] : no difficulty standing [Difficulty Walking] : no difficulty walking [Dyspnea] : no dyspnea [Myalgias] : no myalgias [Muscle Weakness] : no muscle weakness [Muscle Spasms] : no muscle spasms [Muscle Cramping] : no muscle cramping [Visual Changes] : no visual changes [Eye Pain] : no eye pain [Eye Redness] : no eye redness [Dry Eyes] : no dry eyes

## 2023-01-17 NOTE — PHYSICAL EXAM
[General Appearance - Alert] : alert [General Appearance - In No Acute Distress] : in no acute distress [Sclera] : the sclera and conjunctiva were normal [Outer Ear] : the ears and nose were normal in appearance [Oropharynx] : the oropharynx was normal [Neck Appearance] : the appearance of the neck was normal [Neck Cervical Mass (___cm)] : no neck mass was observed [Jugular Venous Distention Increased] : there was no jugular-venous distention [Thyroid Diffuse Enlargement] : the thyroid was not enlarged [Thyroid Nodule] : there were no palpable thyroid nodules [Auscultation Breath Sounds / Voice Sounds] : lungs were clear to auscultation bilaterally [Heart Rate And Rhythm] : heart rate was normal and rhythm regular [Heart Sounds] : normal S1 and S2 [Heart Sounds Gallop] : no gallops [Murmurs] : no murmurs [Heart Sounds Pericardial Friction Rub] : no pericardial rub [Edema] : there was no peripheral edema [Bowel Sounds] : normal bowel sounds [Abdomen Soft] : soft [Abdomen Tenderness] : non-tender [] : no hepato-splenomegaly [Abdomen Mass (___ Cm)] : no abdominal mass palpated [Cervical Lymph Nodes Enlarged Posterior Bilaterally] : posterior cervical [Cervical Lymph Nodes Enlarged Anterior Bilaterally] : anterior cervical [Supraclavicular Lymph Nodes Enlarged Bilaterally] : supraclavicular [No Spinal Tenderness] : no spinal tenderness [No Focal Deficits] : no focal deficits [Oriented To Time, Place, And Person] : oriented to person, place, and time [Impaired Insight] : insight and judgment were intact [Affect] : the affect was normal [FreeTextEntry1] : several lesions on scalp - some eryhtmeaous, some hypopigmented;  several palpable skin-colored lesions on hands, non-tender

## 2023-01-17 NOTE — ASSESSMENT
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  Currently w/ lesions on scalp.\par   - Derm eval\par   - Rx clobetasol 0.05% solution\par   - Will hold off on Plaquenil for now, pending derm eval (see below).\par   - Check labs.\par 2)  Raynaud's:  currently asymptomatic.\par 3)  RIght knee pain:  ?trauma, junaid as the pain started while she was exercising.  Resolved.\par 4)  Lesions on scalp and hands:  unclear etiology \par   - Derm eval.

## 2023-01-18 ENCOUNTER — NON-APPOINTMENT (OUTPATIENT)
Age: 43
End: 2023-01-18

## 2023-01-18 LAB
ALBUMIN SERPL ELPH-MCNC: 4.9 G/DL
ALP BLD-CCNC: 49 U/L
ALT SERPL-CCNC: 7 U/L
ANION GAP SERPL CALC-SCNC: 12 MMOL/L
APPEARANCE: CLEAR
AST SERPL-CCNC: 16 U/L
BACTERIA: NEGATIVE
BASOPHILS # BLD AUTO: 0.02 K/UL
BASOPHILS NFR BLD AUTO: 0.2 %
BILIRUB SERPL-MCNC: 0.6 MG/DL
BILIRUBIN URINE: NEGATIVE
BLOOD URINE: NEGATIVE
BUN SERPL-MCNC: 9 MG/DL
C3 SERPL-MCNC: 90 MG/DL
C4 SERPL-MCNC: 28 MG/DL
CALCIUM SERPL-MCNC: 9.5 MG/DL
CHLORIDE SERPL-SCNC: 101 MMOL/L
CO2 SERPL-SCNC: 24 MMOL/L
COLOR: NORMAL
CREAT SERPL-MCNC: 0.67 MG/DL
CREAT SPEC-SCNC: 140 MG/DL
CREAT/PROT UR: 0.1 RATIO
DSDNA AB SER-ACNC: <12 IU/ML
EGFR: 113 ML/MIN/1.73M2
EOSINOPHIL # BLD AUTO: 0.1 K/UL
EOSINOPHIL NFR BLD AUTO: 1.2 %
GLUCOSE QUALITATIVE U: NEGATIVE
GLUCOSE SERPL-MCNC: 104 MG/DL
HCT VFR BLD CALC: 36.2 %
HGB BLD-MCNC: 11.5 G/DL
HYALINE CASTS: 2 /LPF
IMM GRANULOCYTES NFR BLD AUTO: 0.1 %
KETONES URINE: ABNORMAL
LEUKOCYTE ESTERASE URINE: NEGATIVE
LYMPHOCYTES # BLD AUTO: 2.98 K/UL
LYMPHOCYTES NFR BLD AUTO: 35.3 %
MAN DIFF?: NORMAL
MCHC RBC-ENTMCNC: 30 PG
MCHC RBC-ENTMCNC: 31.8 GM/DL
MCV RBC AUTO: 94.5 FL
MICROSCOPIC-UA: NORMAL
MONOCYTES # BLD AUTO: 0.52 K/UL
MONOCYTES NFR BLD AUTO: 6.2 %
NEUTROPHILS # BLD AUTO: 4.8 K/UL
NEUTROPHILS NFR BLD AUTO: 57 %
NITRITE URINE: NEGATIVE
PH URINE: 6
PLATELET # BLD AUTO: 216 K/UL
POTASSIUM SERPL-SCNC: 3.7 MMOL/L
PROT SERPL-MCNC: 7.6 G/DL
PROT UR-MCNC: 13 MG/DL
PROTEIN URINE: NORMAL
RBC # BLD: 3.83 M/UL
RBC # FLD: 12.6 %
RED BLOOD CELLS URINE: 10 /HPF
SODIUM SERPL-SCNC: 137 MMOL/L
SPECIFIC GRAVITY URINE: 1.02
SQUAMOUS EPITHELIAL CELLS: 3 /HPF
UROBILINOGEN URINE: NORMAL
WBC # FLD AUTO: 8.43 K/UL
WHITE BLOOD CELLS URINE: 1 /HPF

## 2023-01-31 ENCOUNTER — APPOINTMENT (OUTPATIENT)
Dept: RHEUMATOLOGY | Facility: CLINIC | Age: 43
End: 2023-01-31

## 2023-07-20 ENCOUNTER — APPOINTMENT (OUTPATIENT)
Dept: RHEUMATOLOGY | Facility: CLINIC | Age: 43
End: 2023-07-20

## 2023-07-24 NOTE — OB RN PATIENT PROFILE - HAS THE PATIENT RECEIVED THE INFLUENZA VACCINE THIS SEASON?
For information on Fall & Injury Prevention, visit: [email or web address] OR  [email or web address] OR  https://www.cdc.gov/steadi/patient.html no...

## 2024-03-08 NOTE — OB NEONATOLOGY/PEDIATRICIAN DELIVERY SUMMARY - NS_NEWBORNACONDIT_OBGYN_ALL_OB
Vaccine Information Statement(s) or the Emergency Use Authorization was given today. This has been reviewed, questions answered, and verbal consent given by Patient for injection(s) and administration of Shingles.        Patient tolerated without incident. See immunization grid for documentation.  
[facility]

## 2024-09-24 ENCOUNTER — APPOINTMENT (OUTPATIENT)
Dept: RHEUMATOLOGY | Facility: CLINIC | Age: 44
End: 2024-09-24
Payer: MEDICAID

## 2024-09-24 VITALS
WEIGHT: 105 LBS | DIASTOLIC BLOOD PRESSURE: 80 MMHG | BODY MASS INDEX: 17.93 KG/M2 | HEIGHT: 64 IN | OXYGEN SATURATION: 99 % | RESPIRATION RATE: 17 BRPM | HEART RATE: 81 BPM | SYSTOLIC BLOOD PRESSURE: 130 MMHG | TEMPERATURE: 99 F

## 2024-09-24 DIAGNOSIS — I73.00 RAYNAUD'S SYNDROME W/OUT GANGRENE: ICD-10-CM

## 2024-09-24 DIAGNOSIS — M25.561 PAIN IN RIGHT KNEE: ICD-10-CM

## 2024-09-24 DIAGNOSIS — Z79.899 OTHER LONG TERM (CURRENT) DRUG THERAPY: ICD-10-CM

## 2024-09-24 DIAGNOSIS — L93.0 DISCOID LUPUS ERYTHEMATOSUS: ICD-10-CM

## 2024-09-24 DIAGNOSIS — R31.29 OTHER MICROSCOPIC HEMATURIA: ICD-10-CM

## 2024-09-24 PROCEDURE — G2211 COMPLEX E/M VISIT ADD ON: CPT | Mod: NC

## 2024-09-24 PROCEDURE — 99214 OFFICE O/P EST MOD 30 MIN: CPT

## 2024-09-24 RX ORDER — LOSARTAN POTASSIUM 25 MG/1
25 TABLET, FILM COATED ORAL
Refills: 0 | Status: ACTIVE | COMMUNITY

## 2024-09-24 NOTE — ASSESSMENT
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    - Derm f/u   - Cont clobetasol 0.05% solution   - Check labs. 2)  Raynaud's:  currently asymptomatic. 3)  Right knee pain:  ?trauma, junaid as the pain started while she was exercising.  Resolved. 4)  Lesions on abdomen and back:  unclear etiology    - Derm f/u 5)  Pain in right hand/wrist:  ?early OA   - Recommended hand/wrist exercises   - ibuprofen and/or Tylenol prn   - heating pads or ice packs   - OTC topical analgesics

## 2024-09-24 NOTE — HISTORY OF PRESENT ILLNESS
[Fatigue] : fatigue [Skin Lesions] : skin lesions [FreeTextEntry1] : 9/24/2024:  Pt reports that she recently "broke out" w/ a rash on her abdomen and back.  She describes it as "like hives".  (+)very itchy.  She saw derm who prescribed topical steroids and it improved though it left a yazmin on her skin.  She also experienced "an outbreak" in her ear - it also resolved w/ topical steroids. Her right knee pain had resolved until yesterday when she began to experience pain in her posterior right knee, though it has already resolved. She also c/o  intermittent pain in her right hand (primarily PIP's) and wrist - occurs primarily w/ activity.   1/17/2023:  Pt reports that she has been experiencing more lesions on her scalp - she says they feel "bumpy".  No pain or pruritus.  Also w/ (+)associated alopecia.   She also c/o itchy "lumps" on her hands, usually lasting 2-3 months at a time. Feeling fine overall since last visit.  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  No other complaints.  Right knee pain has resolved. No F/C, no unintentional weight loss, no night sweats, no oral ulcers, no joint pains, no alopecia, no photosensitivity, no dry eyes/dry mouth, no Raynaud symptoms, no focal weakness, no dysphagia  [Anorexia] : no anorexia [Weight Loss] : no weight loss [Malaise] : no malaise [Fever] : no fever [Chills] : no chills [Malar Facial Rash] : no malar facial rash [Skin Nodules] : no skin nodules [Oral Ulcers] : no oral ulcers [Cough] : no cough [Dry Mouth] : no dry mouth [Dysphagia] : no dysphagia [Shortness of Breath] : no shortness of breath [Chest Pain] : no chest pain [Arthralgias] : no arthralgias [Joint Swelling] : no joint swelling [Joint Warmth] : no joint warmth [Joint Deformity] : no joint deformity [Decreased ROM] : no decreased range of motion [Morning Stiffness] : no morning stiffness [Falls] : no falls [Difficulty Standing] : no difficulty standing [Difficulty Walking] : no difficulty walking [Dyspnea] : no dyspnea [Myalgias] : no myalgias [Muscle Weakness] : no muscle weakness [Muscle Spasms] : no muscle spasms [Muscle Cramping] : no muscle cramping [Visual Changes] : no visual changes [Eye Pain] : no eye pain [Eye Redness] : no eye redness [Dry Eyes] : no dry eyes

## 2024-09-24 NOTE — PHYSICAL EXAM
[General Appearance - Alert] : alert [General Appearance - In No Acute Distress] : in no acute distress [Sclera] : the sclera and conjunctiva were normal [Outer Ear] : the ears and nose were normal in appearance [Oropharynx] : the oropharynx was normal [Neck Appearance] : the appearance of the neck was normal [Neck Cervical Mass (___cm)] : no neck mass was observed [Jugular Venous Distention Increased] : there was no jugular-venous distention [Thyroid Diffuse Enlargement] : the thyroid was not enlarged [Thyroid Nodule] : there were no palpable thyroid nodules [Auscultation Breath Sounds / Voice Sounds] : lungs were clear to auscultation bilaterally [Heart Rate And Rhythm] : heart rate was normal and rhythm regular [Heart Sounds] : normal S1 and S2 [Heart Sounds Gallop] : no gallops [Murmurs] : no murmurs [Heart Sounds Pericardial Friction Rub] : no pericardial rub [Edema] : there was no peripheral edema [Bowel Sounds] : normal bowel sounds [Abdomen Soft] : soft [Abdomen Tenderness] : non-tender [] : no hepato-splenomegaly [Abdomen Mass (___ Cm)] : no abdominal mass palpated [Cervical Lymph Nodes Enlarged Posterior Bilaterally] : posterior cervical [Cervical Lymph Nodes Enlarged Anterior Bilaterally] : anterior cervical [Supraclavicular Lymph Nodes Enlarged Bilaterally] : supraclavicular [No Spinal Tenderness] : no spinal tenderness [No Focal Deficits] : no focal deficits [Oriented To Time, Place, And Person] : oriented to person, place, and time [Impaired Insight] : insight and judgment were intact [Affect] : the affect was normal [FreeTextEntry1] : No synovitis, full ROM in all joints\par

## 2025-01-23 ENCOUNTER — APPOINTMENT (OUTPATIENT)
Dept: RHEUMATOLOGY | Facility: CLINIC | Age: 45
End: 2025-01-23
